# Patient Record
Sex: FEMALE | URBAN - METROPOLITAN AREA
[De-identification: names, ages, dates, MRNs, and addresses within clinical notes are randomized per-mention and may not be internally consistent; named-entity substitution may affect disease eponyms.]

---

## 2023-03-15 ENCOUNTER — APPOINTMENT (RX ONLY)
Dept: URBAN - METROPOLITAN AREA CLINIC 151 | Facility: CLINIC | Age: 38
Setting detail: DERMATOLOGY
End: 2023-03-15

## 2023-03-15 DIAGNOSIS — D18.0 HEMANGIOMA: ICD-10-CM

## 2023-03-15 DIAGNOSIS — Z85.820 PERSONAL HISTORY OF MALIGNANT MELANOMA OF SKIN: ICD-10-CM

## 2023-03-15 DIAGNOSIS — L81.4 OTHER MELANIN HYPERPIGMENTATION: ICD-10-CM

## 2023-03-15 DIAGNOSIS — Z85.828 PERSONAL HISTORY OF OTHER MALIGNANT NEOPLASM OF SKIN: ICD-10-CM

## 2023-03-15 DIAGNOSIS — L23.9 ALLERGIC CONTACT DERMATITIS, UNSPECIFIED CAUSE: ICD-10-CM | Status: WORSENING

## 2023-03-15 DIAGNOSIS — D22 MELANOCYTIC NEVI: ICD-10-CM

## 2023-03-15 PROBLEM — D23.71 OTHER BENIGN NEOPLASM OF SKIN OF RIGHT LOWER LIMB, INCLUDING HIP: Status: ACTIVE | Noted: 2023-03-15

## 2023-03-15 PROBLEM — D22.5 MELANOCYTIC NEVI OF TRUNK: Status: ACTIVE | Noted: 2023-03-15

## 2023-03-15 PROBLEM — D18.01 HEMANGIOMA OF SKIN AND SUBCUTANEOUS TISSUE: Status: ACTIVE | Noted: 2023-03-15

## 2023-03-15 PROCEDURE — ? PRESCRIPTION

## 2023-03-15 PROCEDURE — ? COUNSELING

## 2023-03-15 PROCEDURE — ? PRESCRIPTION MEDICATION MANAGEMENT

## 2023-03-15 PROCEDURE — 99204 OFFICE O/P NEW MOD 45 MIN: CPT

## 2023-03-15 PROCEDURE — ? DIAGNOSIS COMMENT

## 2023-03-15 RX ORDER — HYDROCORTISONE 25 MG/G
OINTMENT TOPICAL
Qty: 20 | Refills: 0 | Status: ERX | COMMUNITY
Start: 2023-03-15

## 2023-03-15 RX ADMIN — HYDROCORTISONE: 25 OINTMENT TOPICAL at 00:00

## 2023-03-15 ASSESSMENT — LOCATION DETAILED DESCRIPTION DERM
LOCATION DETAILED: RIGHT INFERIOR UPPER BACK
LOCATION DETAILED: SUPERIOR THORACIC SPINE

## 2023-03-15 ASSESSMENT — LOCATION ZONE DERM: LOCATION ZONE: TRUNK

## 2023-03-15 ASSESSMENT — LOCATION SIMPLE DESCRIPTION DERM
LOCATION SIMPLE: UPPER BACK
LOCATION SIMPLE: RIGHT UPPER BACK

## 2023-03-15 NOTE — PROCEDURE: DIAGNOSIS COMMENT
Render Risk Assessment In Note?: no
Detail Level: Detailed
Comment: R popliteal fossa s/p WLE 11/2019. Pt reports no SNL bx. Will obtain outside records from dermatologist in [place] for further information re: depth. Recommend regular appt with dentist, PCP, OBGYN, and ophthalmology given history. No LAD on exam today
Comment: On right submental chin s/p WLE 5/2021. Pt unsure if BCC or SCC, but reports it was a NMSC. Will get outside records from pt’s dermatologist in [place] and recommend follow-up FBSE q6 months or sooner if concerns.
Comment: Recommend 6 month FBSE or sooner if concerns.
Detail Level: Simple
Comment: Flaring despite topical steroid drops from ENT and OTC HCT. Will start topical HCT ointment as below to outside of ear only, counseling as below. Discussed possible triggers including cellphone given unilateral distribution, geometric appearance, and prior positive patch testing to nickel, cobalt. Pt does not currently wear headphones or jewelry.

## 2023-03-15 NOTE — PROCEDURE: PRESCRIPTION MEDICATION MANAGEMENT
Detail Level: Zone
Initiate Treatment: hydrocortisone 2.5 % topical ointment
Render In Strict Bullet Format?: No

## 2023-10-23 PROBLEM — L81.4 OTHER MELANIN HYPERPIGMENTATION: Status: ACTIVE | Noted: 2021-07-06

## 2023-10-23 PROBLEM — L23.0 CONTACT DERMATITIS DUE TO METAL: Status: ACTIVE | Noted: 2023-10-23

## 2023-10-23 PROBLEM — H92.03 OTALGIA, BILATERAL: Status: ACTIVE | Noted: 2023-10-23

## 2023-10-23 PROBLEM — F41.0 PANIC ATTACKS: Status: ACTIVE | Noted: 2023-10-23

## 2023-10-23 PROBLEM — M22.41 CHONDROMALACIA OF RIGHT PATELLA: Status: ACTIVE | Noted: 2023-10-23

## 2023-10-23 PROBLEM — D22.60 MELANOCYTIC NEVI OF UNSPECIFIED UPPER LIMB, INCLUDING SHOULDER: Status: ACTIVE | Noted: 2021-07-06

## 2023-10-23 PROBLEM — J32.2 CHRONIC ETHMOIDAL SINUSITIS: Status: ACTIVE | Noted: 2023-10-23

## 2023-10-23 PROBLEM — Z98.890 STATUS POST BILATERAL BREAST REDUCTION: Status: ACTIVE | Noted: 2023-10-23

## 2023-10-23 PROBLEM — D22.5 MELANOCYTIC NEVI OF TRUNK: Status: ACTIVE | Noted: 2021-07-06

## 2023-10-23 PROBLEM — L91.0 KELOID: Status: ACTIVE | Noted: 2021-07-06

## 2023-10-23 PROBLEM — F41.1 GAD (GENERALIZED ANXIETY DISORDER): Status: ACTIVE | Noted: 2023-10-23

## 2023-10-23 PROBLEM — L70.0 ACNE VULGARIS: Status: ACTIVE | Noted: 2021-07-06

## 2023-10-23 PROBLEM — D48.5 NEOPLASM OF UNCERTAIN BEHAVIOR OF SKIN: Status: ACTIVE | Noted: 2021-07-06

## 2023-10-23 PROBLEM — D22.71 MELANOCYTIC NEVI OF RIGHT LOWER LIMB, INCLUDING HIP: Status: ACTIVE | Noted: 2021-07-06

## 2023-10-23 PROBLEM — M26.69 TMJ CREPITUS: Status: ACTIVE | Noted: 2023-10-23

## 2023-10-23 PROBLEM — J34.2 DEVIATED NASAL SEPTUM: Status: ACTIVE | Noted: 2023-10-23

## 2023-10-23 PROBLEM — F50.81 BINGE-EATING DISORDER, MILD: Status: ACTIVE | Noted: 2023-10-23

## 2023-10-23 PROBLEM — H60.90 RECURRENT OTITIS EXTERNA: Status: ACTIVE | Noted: 2023-10-23

## 2023-10-23 PROBLEM — D18.01 HEMANGIOMA OF SKIN AND SUBCUTANEOUS TISSUE: Status: ACTIVE | Noted: 2021-07-06

## 2023-10-23 PROBLEM — H60.8X3 CHRONIC ECZEMATOUS OTITIS EXTERNA OF BOTH EARS: Status: ACTIVE | Noted: 2023-10-23

## 2023-10-23 PROBLEM — M22.42 CHONDROMALACIA OF LEFT PATELLA: Status: ACTIVE | Noted: 2023-10-23

## 2023-10-23 PROBLEM — J30.9 ALLERGIC RHINITIS: Status: ACTIVE | Noted: 2023-10-23

## 2023-10-23 PROBLEM — Z85.820 PERSONAL HISTORY OF MALIGNANT MELANOMA OF SKIN: Status: ACTIVE | Noted: 2021-07-06

## 2023-10-23 PROBLEM — L90.5 SCAR CONDITION AND FIBROSIS OF SKIN: Status: ACTIVE | Noted: 2021-07-06

## 2023-10-23 PROBLEM — F33.9 DEPRESSION, RECURRENT (CMS-HCC): Status: ACTIVE | Noted: 2023-10-23

## 2023-10-23 PROBLEM — N62 MACROMASTIA: Status: ACTIVE | Noted: 2023-10-23

## 2023-10-23 PROBLEM — L91.8 OTHER HYPERTROPHIC DISORDERS OF THE SKIN: Status: ACTIVE | Noted: 2021-07-06

## 2023-10-23 PROBLEM — L85.3 XEROSIS CUTIS: Status: ACTIVE | Noted: 2021-07-06

## 2023-10-23 PROBLEM — F50.810 BINGE-EATING DISORDER, MILD: Status: ACTIVE | Noted: 2023-10-23

## 2023-10-23 RX ORDER — MINERAL OIL
ENEMA (ML) RECTAL
COMMUNITY
Start: 2021-07-02 | End: 2023-10-25 | Stop reason: ALTCHOICE

## 2023-10-23 RX ORDER — HYDROXYZINE HYDROCHLORIDE 25 MG/1
25 TABLET, FILM COATED ORAL NIGHTLY
COMMUNITY
Start: 2019-10-15

## 2023-10-23 RX ORDER — LEVONORGESTREL 52 MG/1
INTRAUTERINE DEVICE INTRAUTERINE
COMMUNITY
Start: 2019-07-12

## 2023-10-23 RX ORDER — CLOBETASOL PROPIONATE 0.46 MG/ML
SOLUTION TOPICAL 2 TIMES DAILY
COMMUNITY
Start: 2021-12-30 | End: 2023-10-25 | Stop reason: ALTCHOICE

## 2023-10-23 RX ORDER — CLINDAMYCIN PHOSPHATE 10 UG/ML
1 LOTION TOPICAL
COMMUNITY
Start: 2019-01-16 | End: 2023-10-25 | Stop reason: ALTCHOICE

## 2023-10-23 RX ORDER — SERTRALINE HYDROCHLORIDE 100 MG/1
TABLET, FILM COATED ORAL
COMMUNITY
Start: 2021-06-30 | End: 2023-11-03 | Stop reason: ALTCHOICE

## 2023-10-23 RX ORDER — FLUTICASONE PROPIONATE 50 MCG
1 SPRAY, SUSPENSION (ML) NASAL 2 TIMES DAILY
COMMUNITY
Start: 2020-08-05 | End: 2023-10-25 | Stop reason: ALTCHOICE

## 2023-10-23 RX ORDER — SPIRONOLACTONE AND HYDROCHLOROTHIAZIDE 25; 25 MG/1; MG/1
TABLET ORAL
COMMUNITY
Start: 2019-03-05 | End: 2023-10-25 | Stop reason: ALTCHOICE

## 2023-10-23 RX ORDER — DEXTROAMPHETAMINE SACCHARATE, AMPHETAMINE ASPARTATE MONOHYDRATE, DEXTROAMPHETAMINE SULFATE AND AMPHETAMINE SULFATE 7.5; 7.5; 7.5; 7.5 MG/1; MG/1; MG/1; MG/1
CAPSULE, EXTENDED RELEASE ORAL
COMMUNITY
Start: 2018-02-15 | End: 2023-11-03 | Stop reason: ALTCHOICE

## 2023-10-23 RX ORDER — AZELASTINE 1 MG/ML
2 SPRAY, METERED NASAL 2 TIMES DAILY
COMMUNITY
Start: 2020-08-05 | End: 2023-10-25 | Stop reason: ALTCHOICE

## 2023-10-25 ENCOUNTER — OFFICE VISIT (OUTPATIENT)
Dept: DERMATOLOGY | Facility: CLINIC | Age: 38
End: 2023-10-25
Payer: COMMERCIAL

## 2023-10-25 DIAGNOSIS — D18.01 HEMANGIOMA OF SKIN: ICD-10-CM

## 2023-10-25 DIAGNOSIS — D48.5 NEOPLASM OF UNCERTAIN BEHAVIOR OF SKIN: Primary | ICD-10-CM

## 2023-10-25 DIAGNOSIS — L81.4 LENTIGO: ICD-10-CM

## 2023-10-25 DIAGNOSIS — Z85.820 PERSONAL HISTORY OF MALIGNANT MELANOMA OF SKIN: ICD-10-CM

## 2023-10-25 DIAGNOSIS — L90.5 SCAR CONDITIONS AND FIBROSIS OF SKIN: ICD-10-CM

## 2023-10-25 DIAGNOSIS — D22.9 MELANOCYTIC NEVUS, UNSPECIFIED LOCATION: ICD-10-CM

## 2023-10-25 PROCEDURE — 88305 TISSUE EXAM BY PATHOLOGIST: CPT | Performed by: DERMATOLOGY

## 2023-10-25 PROCEDURE — 88342 IMHCHEM/IMCYTCHM 1ST ANTB: CPT | Performed by: DERMATOLOGY

## 2023-10-25 PROCEDURE — 99213 OFFICE O/P EST LOW 20 MIN: CPT | Performed by: DERMATOLOGY

## 2023-10-25 PROCEDURE — 88305 TISSUE EXAM BY PATHOLOGIST: CPT | Mod: TC,DER | Performed by: DERMATOLOGY

## 2023-10-25 PROCEDURE — 88341 IMHCHEM/IMCYTCHM EA ADD ANTB: CPT | Performed by: DERMATOLOGY

## 2023-10-25 PROCEDURE — 11102 TANGNTL BX SKIN SINGLE LES: CPT | Performed by: SPECIALIST

## 2023-10-25 PROCEDURE — 1036F TOBACCO NON-USER: CPT | Performed by: DERMATOLOGY

## 2023-10-25 RX ORDER — LISDEXAMFETAMINE DIMESYLATE 40 MG/1
40 CAPSULE ORAL EVERY MORNING
COMMUNITY

## 2023-10-25 ASSESSMENT — DERMATOLOGY PATIENT ASSESSMENT
DO YOU USE SUNSCREEN: DAILY
WHERE ARE THESE NEW OR CHANGING LESIONS LOCATED: LEFT LEG
DO YOU HAVE ANY NEW OR CHANGING LESIONS: YES
DO YOU HAVE IRREGULAR MENSTRUAL CYCLES: NO
ARE YOU ON BIRTH CONTROL: YES
HAVE YOU HAD OR DO YOU HAVE A STAPH INFECTION: NO
HAVE YOU HAD OR DO YOU HAVE VASCULAR DISEASE: NO
ARE YOU AN ORGAN TRANSPLANT RECIPIENT: NO
DO YOU USE A TANNING BED: YES, PREVIOUSLY
WHAT TYPE OF BIRTH CONTROL: MIRENA
ARE YOU TRYING TO GET PREGNANT: NO

## 2023-10-25 ASSESSMENT — DERMATOLOGY QUALITY OF LIFE (QOL) ASSESSMENT
RATE HOW BOTHERED YOU ARE BY SYMPTOMS OF YOUR SKIN PROBLEM (EG, ITCHING, STINGING BURNING, HURTING OR SKIN IRRITATION): 0 - NEVER BOTHERED
DATE THE QUALITY-OF-LIFE ASSESSMENT WAS COMPLETED: 66772
ARE THERE EXCLUSIONS OR EXCEPTIONS FOR THE QUALITY OF LIFE ASSESSMENT: NO
RATE HOW EMOTIONALLY BOTHERED YOU ARE BY YOUR SKIN PROBLEM (FOR EXAMPLE, WORRY, EMBARRASSMENT, FRUSTRATION): 0 - NEVER BOTHERED
WHAT SINGLE SKIN CONDITION LISTED BELOW IS THE PATIENT ANSWERING THE QUALITY-OF-LIFE ASSESSMENT QUESTIONS ABOUT: NONE OF THE ABOVE

## 2023-10-25 ASSESSMENT — ITCH NUMERIC RATING SCALE: HOW SEVERE IS YOUR ITCHING?: 0

## 2023-10-25 NOTE — PROGRESS NOTES
Subjective   Adriana Gutierrez is a 38 y.o. female who presents for the following: Skin Check and Suspicious Skin Lesion.    Skin Cancer Screening  She has a history of moderate sun exposure. She is in the sun occasionally. She uses sunscreen occasionally. She reports no skin symptoms. Her moles are not changing.      History of MM R popliteal fossa in 2019 and severely dysplastic nevus R superior mid neck 2020    Skin Lesion  She describes it as a spot, that is located on her left leg.  It was first noticed several months ago. It has been causing no skin symptoms and is not changing.       Objective   Well appearing patient in no apparent distress; mood and affect are within normal limits.    A full examination was performed including scalp, head, eyes, ears, nose, lips, neck, chest, axillae, abdomen, back, buttocks, bilateral upper extremities, bilateral lower extremities, hands, feet, fingers, toes, fingernails, and toenails. All findings within normal limits unless otherwise noted below.    Scattered cherry-red papule(s).    All nevi were symmetric brown macules without atypia on dermoscopy.     Scattered tan macules in sun-exposed areas.    Scar is well-healed without evidence of recurrence (under chin, R popliteal fossa)  No palpable JUAN F on exam    No signs of recurrence    Left Thigh - Anterior  3x4mm dark brown papule            Assessment/Plan   Neoplasm of uncertain behavior of skin  Left Thigh - Anterior    Lesion biopsy  Type of biopsy: tangential    Informed consent: discussed and consent obtained    Timeout: patient name, date of birth, surgical site, and procedure verified    Procedure prep:  Patient was prepped and draped  Anesthesia: the lesion was anesthetized in a standard fashion    Anesthetic:  1% lidocaine w/ epinephrine 1-100,000 local infiltration  Instrument used: DermaBlade    Hemostasis achieved with: aluminum chloride    Outcome: patient tolerated procedure well    Post-procedure details:  sterile dressing applied and wound care instructions given    Dressing type: petrolatum and bandage      Staff Communication: Dermatology Local Anesthesia: 1 % Lidocaine / Epinephrine - Amount: 0.4cc    Specimen 1 - Dermatopathology- DERM LAB  Differential Diagnosis: DN vs MIS  Check Margins Yes/No?:   yes  Comments:    Dermpath Lab: Routine Histopathology (formalin-fixed tissue)    Neoplasm(s) of uncertain behavior  - R/o DN vs MIS  - Discussed biopsy of lesion due to concerning features. The risks and benefits were discussed with the patient. The patient expressed understanding and agreed to biopsy. The patient tolerated the procedure well and wound care instructions were provided. Will call in 1-2 weeks to discuss results.   - Also discussed photo-protection including wearing sunscreen and a wide brimmed hat. Discussed concerning features of a lesion including not healing or bleeding or evolving and to return if these were noted.     Hemangioma of skin    Cherry Angiomas  - Discussed with patient these are benign vascular lesions and no treatment is necessary.       Melanocytic nevus, unspecified location    The ABCDEs of melanoma and warning signs of non-melanoma skin cancer were discussed with patient and patient expressed understanding. Sun protection and use of at least SPF 30 discussed with patient. Pt instructed to reapply every 2 hours.     Lentigo    The ABCDEs of melanoma and warning signs of non-melanoma skin cancer were discussed with patient and patient expressed understanding. Sun protection and use of at least SPF 30 discussed with patient. Pt instructed to reapply every 2 hours.     Scar conditions and fibrosis of skin    - no signs of recurrence or clinical adenopathy on exam  - continue regular skin checks    Personal history of malignant melanoma of skin    - continue regular skin checks  - also reviewed regular eye and dental exams    Related Procedures  Follow Up In Dermatology - Established  Patient    Follow-up in 1  year, sooner if needed pending biopsy results    10/25/2023 11:58 AM  Irish Hernández MD  Dermatology Resident, PGY-4     I saw and evaluated the patient. I personally obtained the key and critical portions of the history and physical exam or was physically present for key and critical portions performed by the student/resident. I reviewed the student/resident's documentation and discussed the patient with the student/resident. I agree with the student/resident's medical decision making as documented in the note.

## 2023-11-02 LAB
LAB AP ASR DISCLAIMER: NORMAL
LABORATORY COMMENT REPORT: NORMAL
PATH REPORT.FINAL DX SPEC: NORMAL
PATH REPORT.GROSS SPEC: NORMAL
PATH REPORT.RELEVANT HX SPEC: NORMAL
PATH REPORT.TOTAL CANCER: NORMAL

## 2023-11-03 ENCOUNTER — OFFICE VISIT (OUTPATIENT)
Dept: PRIMARY CARE | Facility: CLINIC | Age: 38
End: 2023-11-03
Payer: COMMERCIAL

## 2023-11-03 ENCOUNTER — LAB (OUTPATIENT)
Dept: LAB | Facility: LAB | Age: 38
End: 2023-11-03
Payer: COMMERCIAL

## 2023-11-03 VITALS
WEIGHT: 189 LBS | TEMPERATURE: 98.2 F | BODY MASS INDEX: 35.71 KG/M2 | DIASTOLIC BLOOD PRESSURE: 74 MMHG | SYSTOLIC BLOOD PRESSURE: 110 MMHG | OXYGEN SATURATION: 99 % | RESPIRATION RATE: 16 BRPM | HEART RATE: 87 BPM

## 2023-11-03 DIAGNOSIS — K62.5 BRBPR (BRIGHT RED BLOOD PER RECTUM): ICD-10-CM

## 2023-11-03 DIAGNOSIS — K59.00 CONSTIPATION, UNSPECIFIED CONSTIPATION TYPE: ICD-10-CM

## 2023-11-03 DIAGNOSIS — R04.2 HEMOPTYSIS: Primary | ICD-10-CM

## 2023-11-03 DIAGNOSIS — K21.9 GASTROESOPHAGEAL REFLUX DISEASE WITHOUT ESOPHAGITIS: ICD-10-CM

## 2023-11-03 DIAGNOSIS — R04.2 HEMOPTYSIS: ICD-10-CM

## 2023-11-03 LAB
ALBUMIN SERPL BCP-MCNC: 4.1 G/DL (ref 3.4–5)
ALP SERPL-CCNC: 50 U/L (ref 33–110)
ALT SERPL W P-5'-P-CCNC: 11 U/L (ref 7–45)
ANION GAP SERPL CALC-SCNC: 9 MMOL/L (ref 10–20)
AST SERPL W P-5'-P-CCNC: 12 U/L (ref 9–39)
BASOPHILS # BLD AUTO: 0.03 X10*3/UL (ref 0–0.1)
BASOPHILS NFR BLD AUTO: 0.4 %
BILIRUB SERPL-MCNC: 0.3 MG/DL (ref 0–1.2)
BUN SERPL-MCNC: 12 MG/DL (ref 6–23)
CALCIUM SERPL-MCNC: 9.2 MG/DL (ref 8.6–10.3)
CHLORIDE SERPL-SCNC: 104 MMOL/L (ref 98–107)
CO2 SERPL-SCNC: 30 MMOL/L (ref 21–32)
CREAT SERPL-MCNC: 0.74 MG/DL (ref 0.5–1.05)
EOSINOPHIL # BLD AUTO: 0.08 X10*3/UL (ref 0–0.7)
EOSINOPHIL NFR BLD AUTO: 1 %
ERYTHROCYTE [DISTWIDTH] IN BLOOD BY AUTOMATED COUNT: 12.2 % (ref 11.5–14.5)
GFR SERPL CREATININE-BSD FRML MDRD: >90 ML/MIN/1.73M*2
GLUCOSE SERPL-MCNC: 94 MG/DL (ref 74–99)
HCT VFR BLD AUTO: 39.1 % (ref 36–46)
HGB BLD-MCNC: 12.6 G/DL (ref 12–16)
IMM GRANULOCYTES # BLD AUTO: 0.02 X10*3/UL (ref 0–0.7)
IMM GRANULOCYTES NFR BLD AUTO: 0.3 % (ref 0–0.9)
IRON SATN MFR SERPL: 42 %
IRON SERPL-MCNC: 137 UG/DL (ref 35–150)
LYMPHOCYTES # BLD AUTO: 2.26 X10*3/UL (ref 1.2–4.8)
LYMPHOCYTES NFR BLD AUTO: 28.3 %
MCH RBC QN AUTO: 29.8 PG (ref 26–34)
MCHC RBC AUTO-ENTMCNC: 32.2 G/DL (ref 32–36)
MCV RBC AUTO: 92 FL (ref 80–100)
MONOCYTES # BLD AUTO: 0.44 X10*3/UL (ref 0.1–1)
MONOCYTES NFR BLD AUTO: 5.5 %
NEUTROPHILS # BLD AUTO: 5.15 X10*3/UL (ref 1.2–7.7)
NEUTROPHILS NFR BLD AUTO: 64.5 %
NRBC BLD-RTO: 0 /100 WBCS (ref 0–0)
PLATELET # BLD AUTO: 235 X10*3/UL (ref 150–450)
POTASSIUM SERPL-SCNC: 4.4 MMOL/L (ref 3.5–5.3)
PROT SERPL-MCNC: 6.4 G/DL (ref 6.4–8.2)
RBC # BLD AUTO: 4.23 X10*6/UL (ref 4–5.2)
SODIUM SERPL-SCNC: 139 MMOL/L (ref 136–145)
TIBC SERPL-MCNC: 324 UG/DL
UIBC SERPL-MCNC: 187 UG/DL (ref 110–370)
WBC # BLD AUTO: 8 X10*3/UL (ref 4.4–11.3)

## 2023-11-03 PROCEDURE — 99214 OFFICE O/P EST MOD 30 MIN: CPT | Performed by: FAMILY MEDICINE

## 2023-11-03 PROCEDURE — 80053 COMPREHEN METABOLIC PANEL: CPT

## 2023-11-03 PROCEDURE — 36415 COLL VENOUS BLD VENIPUNCTURE: CPT

## 2023-11-03 PROCEDURE — 83540 ASSAY OF IRON: CPT

## 2023-11-03 PROCEDURE — 1036F TOBACCO NON-USER: CPT | Performed by: FAMILY MEDICINE

## 2023-11-03 PROCEDURE — 85025 COMPLETE CBC W/AUTO DIFF WBC: CPT

## 2023-11-03 PROCEDURE — 83550 IRON BINDING TEST: CPT

## 2023-11-03 RX ORDER — OMEPRAZOLE 40 MG/1
CAPSULE, DELAYED RELEASE ORAL
COMMUNITY
Start: 2023-10-31 | End: 2023-11-03 | Stop reason: SDUPTHER

## 2023-11-03 RX ORDER — OMEPRAZOLE 40 MG/1
40 CAPSULE, DELAYED RELEASE ORAL
Qty: 30 CAPSULE | Refills: 1 | Status: SHIPPED
Start: 2023-11-03 | End: 2024-01-05 | Stop reason: ALTCHOICE

## 2023-11-03 RX ORDER — SERTRALINE HYDROCHLORIDE 200 MG/1
200 CAPSULE ORAL DAILY
COMMUNITY

## 2023-11-03 NOTE — PATIENT INSTRUCTIONS
Today we addressed your vomiting of blood and urgicare visit.   I would like to have you check labs today and continue your omeprazole for 2 more months  GI referral has been placed as well I think that you will need an EGD and possibly a colonscopy for seeing blood in your stool as well.     Follow up when results received.

## 2023-11-03 NOTE — PROGRESS NOTES
Subjective   Patient ID: Adriana Gutierrez is a 38 y.o. female who presents for Follow-up (Pt was seen at urgent care health [facility] in [place] on [address] 10/31/2023 - heartburn and vomiting. Blood seen in vomit ).    She got bad acid reflux on Monday and then was throwing up and saw blood in the vomiting.  She went to an urgicare and they examined her.  They prescribed prilosec. She got it Wednesday and started it yesterday.  She hasn't had anymore vomiting of blood.  There was bright red blood in the vomiting.  Seh is having some pain in the center of the belly and it was burning up into the chest pretty bad as well.  Today it's a little better.  She denies black tarry stool or bright red blood in the stool.  There was blood both on the toilet tissue and in the toilet itself. She was constipated at that time.  She doesn't know if she has ever had a cscope or an egd.           Review of Systems    Objective   /74   Pulse 87   Temp 36.8 °C (98.2 °F)   Resp 16   Wt 85.7 kg (189 lb)   SpO2 99%   BMI 35.71 kg/m²     Physical Exam  Constitutional:       Appearance: Normal appearance.   HENT:      Head: Normocephalic and atraumatic.      Mouth/Throat:      Mouth: Mucous membranes are moist.   Eyes:      Pupils: Pupils are equal, round, and reactive to light.   Cardiovascular:      Rate and Rhythm: Normal rate and regular rhythm.      Pulses: Normal pulses.   Pulmonary:      Effort: Pulmonary effort is normal.      Breath sounds: Normal breath sounds.   Abdominal:      General: Bowel sounds are normal. There is no distension.      Palpations: There is no mass.      Tenderness: There is no abdominal tenderness. There is no guarding.   Musculoskeletal:      Cervical back: Normal range of motion and neck supple.   Neurological:      Mental Status: She is alert.         Assessment/Plan   Diagnoses and all orders for this visit:  Hemoptysis  -     CBC and Auto Differential; Future  -     Comprehensive Metabolic  Panel; Future  -     Iron and TIBC; Future  BRBPR (bright red blood per rectum)  Constipation, unspecified constipation type  Gastroesophageal reflux disease without esophagitis  -     omeprazole (PriLOSEC) 40 mg DR capsule; Take 1 capsule (40 mg) by mouth once daily in the morning. Take before meals. Do not crush or chew.  -     Referral to Gastroenterology; Future

## 2023-11-07 DIAGNOSIS — D23.72 DYSPLASTIC NEVUS OF LEFT LOWER EXTREMITY: Primary | ICD-10-CM

## 2023-11-07 NOTE — RESULT ENCOUNTER NOTE
Patient has been informed and verbalized understanding. She has been scheduled for 12/4/2023 @ 10:15 and order for PA has been submitted.

## 2023-11-29 ENCOUNTER — OFFICE VISIT (OUTPATIENT)
Dept: GASTROENTEROLOGY | Facility: CLINIC | Age: 38
End: 2023-11-29
Payer: COMMERCIAL

## 2023-11-29 VITALS
HEIGHT: 61 IN | SYSTOLIC BLOOD PRESSURE: 157 MMHG | BODY MASS INDEX: 36.06 KG/M2 | DIASTOLIC BLOOD PRESSURE: 84 MMHG | WEIGHT: 191 LBS | HEART RATE: 85 BPM

## 2023-11-29 DIAGNOSIS — K92.0 HEMATEMESIS WITH NAUSEA: ICD-10-CM

## 2023-11-29 DIAGNOSIS — F41.1 GAD (GENERALIZED ANXIETY DISORDER): ICD-10-CM

## 2023-11-29 DIAGNOSIS — F33.9 DEPRESSION, RECURRENT (CMS-HCC): ICD-10-CM

## 2023-11-29 DIAGNOSIS — K21.9 GASTROESOPHAGEAL REFLUX DISEASE WITHOUT ESOPHAGITIS: ICD-10-CM

## 2023-11-29 DIAGNOSIS — K92.1 HEMATOCHEZIA: Primary | ICD-10-CM

## 2023-11-29 PROCEDURE — 1036F TOBACCO NON-USER: CPT | Performed by: STUDENT IN AN ORGANIZED HEALTH CARE EDUCATION/TRAINING PROGRAM

## 2023-11-29 PROCEDURE — 99205 OFFICE O/P NEW HI 60 MIN: CPT | Performed by: STUDENT IN AN ORGANIZED HEALTH CARE EDUCATION/TRAINING PROGRAM

## 2023-11-29 RX ORDER — SODIUM CHLORIDE, SODIUM LACTATE, POTASSIUM CHLORIDE, CALCIUM CHLORIDE 600; 310; 30; 20 MG/100ML; MG/100ML; MG/100ML; MG/100ML
20 INJECTION, SOLUTION INTRAVENOUS CONTINUOUS
Status: CANCELLED | OUTPATIENT
Start: 2023-11-29

## 2023-11-29 RX ORDER — ONDANSETRON HYDROCHLORIDE 2 MG/ML
4 INJECTION, SOLUTION INTRAVENOUS ONCE AS NEEDED
Status: CANCELLED | OUTPATIENT
Start: 2023-11-29

## 2023-11-29 NOTE — PROGRESS NOTES
Subjective     History of Present Illness:   Adriana Gutierrez is a 38 y.o. female with hx of AGUSTINA, depression, GERD and binge eating disorder who presents to clinic for episode of resolved hematemesis and  blood streaked stool.    She states one month ago had worsening nausea associated with several episodes of blood-streaked emesis that resolved on its own.  The following day she was started on omeprazole and has been on this since.  She has not had recurrence of the symptoms since.  She has not had prior hematemesis in the past.  She does endorse a history of heartburn though this is sporadic and occurs every other month on average.  She has not otherwise had any weight loss.  She states her bowel movements are regular and she has 1 soft bowel movement a day.  She does endorse episodes of blood-streaked  over the past month as well, this does not fill the toilet bowel and is usually surrounding the stool or on the toilet paper.  She has no abdominal pain.  She has no family history of GI malignancy.      Past Medical History   has a past medical history of Acne, ADHD (attention deficit hyperactivity disorder), Allergic, Anxiety, Depression, Encounter for screening for infections with a predominantly sexual mode of transmission (02/15/2018), GERD (gastroesophageal reflux disease), Headache, Heart murmur, Melanoma (CMS/[facility]) (10/2019), Other specified health status (02/15/2018), Personal history of other diseases of the female genital tract (02/15/2018), Personal history of other infectious and parasitic diseases (12/15/2014), and Urticaria.     Social History   reports that she has quit smoking. Her smoking use included cigarettes. She has never used smokeless tobacco. She reports that she does not currently use alcohol. She reports that she does not use drugs.     Family History  family history includes Allergies in an other family member; Arthritis in her maternal grandmother and mother; Cancer in her father's  sister; Diabetes in her mother; Eczema in her sister; Hypertension in her mother; Rashes / Skin problems in her sister; Stroke in her paternal grandmother.     Allergies  Allergies   Allergen Reactions    Doxycycline GI Upset       Medications  Current Outpatient Medications   Medication Instructions    hydrOXYzine HCL (ATARAX) 25 mg, oral, 4 times daily PRN    levonorgestrel (Mirena) 21 mcg/24 hours (8 yrs) 52 mg IUD intrauterine    lisdexamfetamine (VYVANSE) 40 mg, oral, Every morning    omeprazole (PRILOSEC) 40 mg, oral, Daily before breakfast, Do not crush or chew.    sertraline 200 mg, oral, Daily        Objective   Visit Vitals  /84   Pulse 85      Physical Exam  Constitutional:       General: She is not in acute distress.     Appearance: Normal appearance.   HENT:      Head: Normocephalic and atraumatic.      Mouth/Throat:      Mouth: Mucous membranes are moist.   Eyes:      Extraocular Movements: Extraocular movements intact.   Pulmonary:      Effort: Pulmonary effort is normal.      Breath sounds: No stridor. No wheezing.   Abdominal:      General: Abdomen is flat. There is no distension.      Palpations: Abdomen is soft. There is no mass.      Tenderness: There is no abdominal tenderness. There is no guarding or rebound.   Musculoskeletal:         General: Normal range of motion.   Skin:     General: Skin is warm and dry.   Neurological:      General: No focal deficit present.      Mental Status: She is alert.   Psychiatric:         Mood and Affect: Mood normal.         Judgment: Judgment normal.       Assessment/Plan   Adriana Gutierrez is a 38 y.o. female with hx of AGUSTINA, depression, GERD and binge eating disorder who presents to clinic for episode of resolved hematemesis and  blood streaked stool.     Symptoms are suggestive of rectal outlet bleeding.  She reassuringly has normal hemoglobin levels.  No alarm signs otherwise.  Unclear etiology of resolved episode of emesis however we will plan on upper  endoscopy and colonoscopy to rule out any anatomical pathology.  She may continue omeprazole to complete an 8-week course for now.    Of note patient, endorses significant postnasal drip and follows with ENT for this.  She states the omeprazole has not improved these symptoms.  Discussed with patient that her sporadic heartburn (occurring every other month) is unlikely to be cause of her postnasal drip, however if she has worsening heartburn after completion  of omeprazole course we may consider further esophageal testing at that time.    Problem List Items Addressed This Visit          Mental Health    Depression, recurrent (CMS/HCC)    AGUSTINA (generalized anxiety disorder)     Other Visit Diagnoses       Hematochezia    -  Primary    Relevant Orders    Colonoscopy Diagnostic (hematochezia)    Gastroesophageal reflux disease without esophagitis        Relevant Orders    EGD    Hematemesis with nausea                       Lubna Brooks MD         My final recommendations will be communicated back to the requesting physician by way of shared Medical record or letter to requesting physician via fax.

## 2023-12-04 ENCOUNTER — PROCEDURE VISIT (OUTPATIENT)
Dept: DERMATOLOGY | Facility: CLINIC | Age: 38
End: 2023-12-04
Payer: COMMERCIAL

## 2023-12-04 DIAGNOSIS — D48.5 NEOPLASM OF UNCERTAIN BEHAVIOR OF SKIN: ICD-10-CM

## 2023-12-04 PROCEDURE — 12032 INTMD RPR S/A/T/EXT 2.6-7.5: CPT | Performed by: SPECIALIST

## 2023-12-04 PROCEDURE — 88305 TISSUE EXAM BY PATHOLOGIST: CPT | Mod: TC,DER | Performed by: DERMATOLOGY

## 2023-12-04 PROCEDURE — 11402 EXC TR-EXT B9+MARG 1.1-2 CM: CPT | Performed by: SPECIALIST

## 2023-12-04 PROCEDURE — 88305 TISSUE EXAM BY PATHOLOGIST: CPT | Performed by: DERMATOLOGY

## 2023-12-04 ASSESSMENT — DERMATOLOGY QUALITY OF LIFE (QOL) ASSESSMENT
ARE THERE EXCLUSIONS OR EXCEPTIONS FOR THE QUALITY OF LIFE ASSESSMENT: NO
DATE THE QUALITY-OF-LIFE ASSESSMENT WAS COMPLETED: 66812
WHAT SINGLE SKIN CONDITION LISTED BELOW IS THE PATIENT ANSWERING THE QUALITY-OF-LIFE ASSESSMENT QUESTIONS ABOUT: NONE OF THE ABOVE
RATE HOW BOTHERED YOU ARE BY SYMPTOMS OF YOUR SKIN PROBLEM (EG, ITCHING, STINGING BURNING, HURTING OR SKIN IRRITATION): 0 - NEVER BOTHERED
RATE HOW EMOTIONALLY BOTHERED YOU ARE BY YOUR SKIN PROBLEM (FOR EXAMPLE, WORRY, EMBARRASSMENT, FRUSTRATION): 0 - NEVER BOTHERED
RATE HOW BOTHERED YOU ARE BY EFFECTS OF YOUR SKIN PROBLEMS ON YOUR ACTIVITIES (EG, GOING OUT, ACCOMPLISHING WHAT YOU WANT, WORK ACTIVITIES OR YOUR RELATIONSHIPS WITH OTHERS): 0 - NEVER BOTHERED

## 2023-12-04 ASSESSMENT — DERMATOLOGY PATIENT ASSESSMENT
HAVE YOU HAD OR DO YOU HAVE VASCULAR DISEASE: NO
ARE YOU AN ORGAN TRANSPLANT RECIPIENT: NO
ARE YOU TRYING TO GET PREGNANT: NO
DO YOU HAVE ANY NEW OR CHANGING LESIONS: NO
DO YOU USE SUNSCREEN: DAILY
ARE YOU ON BIRTH CONTROL: YES
DO YOU USE A TANNING BED: YES, PREVIOUSLY
HAVE YOU HAD OR DO YOU HAVE A STAPH INFECTION: NO
WHAT TYPE OF BIRTH CONTROL: MIRENA
DO YOU HAVE IRREGULAR MENSTRUAL CYCLES: NO

## 2023-12-04 ASSESSMENT — ITCH NUMERIC RATING SCALE: HOW SEVERE IS YOUR ITCHING?: 0

## 2023-12-04 ASSESSMENT — PATIENT GLOBAL ASSESSMENT (PGA): PATIENT GLOBAL ASSESSMENT: PATIENT GLOBAL ASSESSMENT:  1 - CLEAR

## 2023-12-04 NOTE — PROGRESS NOTES
Subjective   Adriana Gutierrez is a 38 y.o. female who presents for the following: Excision (AJMN on left thigh - anterior).      Objective   Well appearing patient in no apparent distress; mood and affect are within normal limits.    A focused examination was performed including left thigh - anterior. All findings within normal limits unless otherwise noted below.    Left Thigh - Anterior  Is a 0.7 x 0.5 cm scar              Assessment/Plan   Neoplasm of uncertain behavior of skin  Left Thigh - Anterior    Skin excision    Lesion length (cm):  0.7  Lesion width (cm):  0.5  Margin per side (cm):  0.3  Total excision diameter (cm):  1.3  Informed consent: discussed and consent obtained    Timeout: patient name, date of birth, surgical site, and procedure verified    Procedure prep:  Patient was prepped and draped  Anesthesia: the lesion was anesthetized in a standard fashion    Anesthetic:  1% lidocaine w/ epinephrine 1-100,000 local infiltration  Instrument used: #15 blade    Hemostasis achieved with: suture and electrodesiccation    Outcome: patient tolerated procedure well with no complications    Post-procedure details: sterile dressing applied and wound care instructions given    Dressing type: pressure dressing, Steri-Strips, Telfa pad and Hypafix    Additional details:  The possible diagnoses were explained. Although the lesion is likely benign, the patient requests removal of the lesion because of the symptoms it is causing. Excision was discussed with the patient. The risks, benefits and potential adverse effects were reviewed. Discussion included but was not limited to the cure rate, relative cost, wound care requirements, activity restrictions, likely scar outcome and time to heal were reviewed. Alternative options including monitoring the lesion were discussed. The patient elected to proceed with excision.     Skin repair  Complexity:  Intermediate  Final length (cm):  5.3  Informed consent: discussed and  consent obtained    Timeout: patient name, date of birth, surgical site, and procedure verified    Procedure prep:  Patient prepped in sterile fashion  Anesthesia: the lesion was anesthetized in a standard fashion    Anesthetic:  1% lidocaine w/ epinephrine 1-100,000 local infiltration  Reason for type of repair: enhance both functionality and cosmetic results    Undermining: edges undermined    Subcutaneous layers (deep stitches):   Suture size:  3-0  Suture type: Vicryl (polyglactin 910)    Stitches:  Buried vertical mattress  Fine/surface layer approximation (top stitches):   Suture size:  3-0  Suture type: Prolene (polypropylene)    Stitches: vertical mattress and running subcuticular    Suture removal (days):  14  Hemostasis achieved with: electrodesiccation  Outcome: patient tolerated procedure well with no complications    Post-procedure details: sterile dressing applied and wound care instructions given    Dressing type: pressure dressing      Staff Communication: Dermatology Local Anesthesia: 1 % Lidocaine / Epinephrine - Amount:  3cc    Specimen 1 - Dermatopathology- DERM LAB  Differential Diagnosis: AJMN  Check Margins Yes/No?:  Yes  Comments:    Dermpath Lab: Routine Histopathology (formalin-fixed tissue)    12/4/2023 11:34 AM  Irish Hernández MD  Dermatology Resident, PGY-4     I saw and evaluated the patient. I personally obtained the key and critical portions of the history and physical exam or was physically present for key and critical portions performed by the student/resident. I reviewed the student/resident's documentation and discussed the patient with the student/resident. I agree with the student/resident's medical decision making as documented in the note.

## 2023-12-05 ENCOUNTER — OFFICE VISIT (OUTPATIENT)
Dept: DERMATOLOGY | Facility: CLINIC | Age: 38
End: 2023-12-05
Payer: COMMERCIAL

## 2023-12-05 DIAGNOSIS — T81.31XA DISRUPTION OR DEHISCENCE OF CLOSURE OF SKIN, INITIAL ENCOUNTER: Primary | ICD-10-CM

## 2023-12-05 PROCEDURE — 99024 POSTOP FOLLOW-UP VISIT: CPT | Performed by: DERMATOLOGY

## 2023-12-05 PROCEDURE — 1036F TOBACCO NON-USER: CPT | Performed by: DERMATOLOGY

## 2023-12-05 NOTE — PROGRESS NOTES
Subjective   Adriana Gutierrez is a 38 y.o. female who presents for the following: Wound Check.    Post-operative visit  Adriana Gutierrez is a 38 y.o. female who presents for 1 day follow up after surgery performed by Dr. Baca on 12/4/2023 for a neoplasm of uncertain behavior of skin. The patient states that the sutures popped after she bent down. She heard that a popping sound and the next morning she felt pain and saw it had opened. She also mentioned that she lifted her dog last night that weighed more than 10lbs.         Objective   Well appearing patient in no apparent distress; mood and affect are within normal limits.          Left Thigh - Anterior  Dehisced site with minimal erythema            Assessment/Plan   Disruption or dehiscence of closure of skin, initial encounter  Left Thigh - Anterior    Skin repair - Left Thigh - Anterior  Complexity:  Intermediate  Final length (cm):  5  Informed consent: discussed and consent obtained    Timeout: patient name, date of birth, surgical site, and procedure verified    Procedure prep:  Patient prepped in sterile fashion  Anesthesia: the lesion was anesthetized in a standard fashion    Anesthetic:  1% lidocaine w/ epinephrine 1-100,000 local infiltration  Reason for type of repair: reduce the risk of dehiscence, infection, and necrosis and enhance both functionality and cosmetic results    Undermining: edges could be approximated without difficulty    Subcutaneous layers (deep stitches):   Suture size:  3-0  Suture type: Vicryl (polyglactin 910)    Stitches:  Buried vertical mattress  Fine/surface layer approximation (top stitches):   Suture size:  4-0  Suture type: Prolene (polypropylene)    Stitches: simple running    Suture removal (days):  14  Hemostasis achieved with: pressure and electrodesiccation  Outcome: patient tolerated procedure well with no complications    Post-procedure details: sterile dressing applied and wound care instructions given           Pt is a 89 y/o Female with pmhx of arthritis coming in with progressively worsening shortness of breath x1 week.

## 2023-12-07 LAB
LABORATORY COMMENT REPORT: NORMAL
PATH REPORT.FINAL DX SPEC: NORMAL
PATH REPORT.GROSS SPEC: NORMAL
PATH REPORT.MICROSCOPIC SPEC OTHER STN: NORMAL
PATH REPORT.RELEVANT HX SPEC: NORMAL
PATH REPORT.TOTAL CANCER: NORMAL

## 2023-12-08 RX ORDER — CEPHALEXIN 500 MG/1
500 CAPSULE ORAL 3 TIMES DAILY
Qty: 21 CAPSULE | Refills: 0 | Status: SHIPPED | OUTPATIENT
Start: 2023-12-08 | End: 2023-12-15

## 2023-12-11 NOTE — RESULT ENCOUNTER NOTE
Pt has been informed that the excision result has come back clear margins so no further tx is needed and to follow up for regular fbse.

## 2023-12-18 ENCOUNTER — CLINICAL SUPPORT (OUTPATIENT)
Dept: DERMATOLOGY | Facility: CLINIC | Age: 38
End: 2023-12-18
Payer: COMMERCIAL

## 2023-12-18 DIAGNOSIS — Z48.02 ENCOUNTER FOR REMOVAL OF SUTURES: ICD-10-CM

## 2023-12-18 PROCEDURE — 15854 REMOVAL SUTR&STAPL XREQ ANES: CPT | Performed by: DERMATOLOGY

## 2023-12-18 ASSESSMENT — DERMATOLOGY QUALITY OF LIFE (QOL) ASSESSMENT
RATE HOW BOTHERED YOU ARE BY EFFECTS OF YOUR SKIN PROBLEMS ON YOUR ACTIVITIES (EG, GOING OUT, ACCOMPLISHING WHAT YOU WANT, WORK ACTIVITIES OR YOUR RELATIONSHIPS WITH OTHERS): 3
RATE HOW BOTHERED YOU ARE BY EFFECTS OF YOUR SKIN PROBLEMS ON YOUR ACTIVITIES (EG, GOING OUT, ACCOMPLISHING WHAT YOU WANT, WORK ACTIVITIES OR YOUR RELATIONSHIPS WITH OTHERS): 3
RATE HOW EMOTIONALLY BOTHERED YOU ARE BY YOUR SKIN PROBLEM (FOR EXAMPLE, WORRY, EMBARRASSMENT, FRUSTRATION): 3
RATE HOW BOTHERED YOU ARE BY SYMPTOMS OF YOUR SKIN PROBLEM (EG, ITCHING, STINGING BURNING, HURTING OR SKIN IRRITATION): 5
RATE HOW EMOTIONALLY BOTHERED YOU ARE BY YOUR SKIN PROBLEM (FOR EXAMPLE, WORRY, EMBARRASSMENT, FRUSTRATION): 3
RATE HOW BOTHERED YOU ARE BY SYMPTOMS OF YOUR SKIN PROBLEM (EG, ITCHING, STINGING BURNING, HURTING OR SKIN IRRITATION): 5

## 2023-12-18 NOTE — PROGRESS NOTES
Subjective     Adriana Gutierrez is a 38 y.o. female who presents for the following: Suture / Staple Removal (Pt is here for suture removal from an excision completed 12/4/23 and dehiscence with closure completed 12/5/23. Pt notes steri strips came off sooner than she wanted and applied butterfly steri strips at home which caused some skin irritation and several small blisters near suture line.  Pt notes improvement.  Wound well approximated and no evidence of infection.  Sutures removed, vaseline and telfa applied. ).     Review of Systems:  No other skin or systemic complaints other than what is documented elsewhere in the note.    The following portions of the chart were reviewed this encounter and updated as appropriate:          Skin Cancer History  No skin cancer on file.      Specialty Problems          Dermatology Problems    Acne vulgaris    Hemangioma of skin and subcutaneous tissue    Keloid    Melanocytic nevi of right lower limb, including hip    Melanocytic nevi of trunk    Melanocytic nevi of unspecified upper limb, including shoulder    Neoplasm of uncertain behavior of skin    Other hypertrophic disorders of the skin    Other melanin hyperpigmentation    Personal history of malignant melanoma of skin    Scar condition and fibrosis of skin    Xerosis cutis    Contact dermatitis due to metal        Objective   Well appearing patient in no apparent distress; mood and affect are within normal limits.    A focused skin examination was performed. All findings within normal limits unless otherwise noted below.    Assessment/Plan   1. Encounter for removal of sutures  Left Thigh - Anterior    Suture Removal - Left Thigh - Anterior    Performed by: Tami Ventura LPN  Authorized by: Callie Baca MD  Consent: Verbal consent obtained.  Consent given by: patient  Patient understanding: patient states understanding of the procedure being performed  Patient identity confirmed: verbally with  patient  Wound Appearance: clean  Sutures removed: 4 interrupted sutures and 1 running subcuticular removed.  Post-removal: dressing applied  Facility: sutures placed in this facility  Patient tolerance: patient tolerated the procedure well with no immediate complications

## 2024-01-05 ENCOUNTER — HOSPITAL ENCOUNTER (OUTPATIENT)
Dept: GASTROENTEROLOGY | Facility: HOSPITAL | Age: 39
Setting detail: OUTPATIENT SURGERY
Discharge: HOME | End: 2024-01-05
Payer: COMMERCIAL

## 2024-01-05 ENCOUNTER — ANESTHESIA EVENT (OUTPATIENT)
Dept: GASTROENTEROLOGY | Facility: HOSPITAL | Age: 39
End: 2024-01-05
Payer: COMMERCIAL

## 2024-01-05 ENCOUNTER — ANESTHESIA (OUTPATIENT)
Dept: GASTROENTEROLOGY | Facility: HOSPITAL | Age: 39
End: 2024-01-05
Payer: COMMERCIAL

## 2024-01-05 VITALS
OXYGEN SATURATION: 99 % | TEMPERATURE: 96.4 F | DIASTOLIC BLOOD PRESSURE: 77 MMHG | WEIGHT: 180 LBS | SYSTOLIC BLOOD PRESSURE: 127 MMHG | BODY MASS INDEX: 33.99 KG/M2 | HEIGHT: 61 IN | HEART RATE: 83 BPM | RESPIRATION RATE: 16 BRPM

## 2024-01-05 DIAGNOSIS — K21.9 GASTROESOPHAGEAL REFLUX DISEASE WITHOUT ESOPHAGITIS: ICD-10-CM

## 2024-01-05 DIAGNOSIS — K92.1 HEMATOCHEZIA: ICD-10-CM

## 2024-01-05 LAB — HCG UR QL IA.RAPID: NEGATIVE

## 2024-01-05 PROCEDURE — 88305 TISSUE EXAM BY PATHOLOGIST: CPT | Performed by: PATHOLOGY

## 2024-01-05 PROCEDURE — 2500000004 HC RX 250 GENERAL PHARMACY W/ HCPCS (ALT 636 FOR OP/ED): Performed by: NURSE ANESTHETIST, CERTIFIED REGISTERED

## 2024-01-05 PROCEDURE — 88305 TISSUE EXAM BY PATHOLOGIST: CPT | Mod: TC,SUR,STJLAB | Performed by: STUDENT IN AN ORGANIZED HEALTH CARE EDUCATION/TRAINING PROGRAM

## 2024-01-05 PROCEDURE — 7100000010 HC PHASE TWO TIME - EACH INCREMENTAL 1 MINUTE: Performed by: STUDENT IN AN ORGANIZED HEALTH CARE EDUCATION/TRAINING PROGRAM

## 2024-01-05 PROCEDURE — 45378 DIAGNOSTIC COLONOSCOPY: CPT | Performed by: STUDENT IN AN ORGANIZED HEALTH CARE EDUCATION/TRAINING PROGRAM

## 2024-01-05 PROCEDURE — 2500000001 HC RX 250 WO HCPCS SELF ADMINISTERED DRUGS (ALT 637 FOR MEDICARE OP): Performed by: STUDENT IN AN ORGANIZED HEALTH CARE EDUCATION/TRAINING PROGRAM

## 2024-01-05 PROCEDURE — A43239 PR EDG TRANSORAL BIOPSY SINGLE/MULTIPLE: Performed by: NURSE ANESTHETIST, CERTIFIED REGISTERED

## 2024-01-05 PROCEDURE — 3700000001 HC GENERAL ANESTHESIA TIME - INITIAL BASE CHARGE: Performed by: STUDENT IN AN ORGANIZED HEALTH CARE EDUCATION/TRAINING PROGRAM

## 2024-01-05 PROCEDURE — A43239 PR EDG TRANSORAL BIOPSY SINGLE/MULTIPLE: Performed by: ANESTHESIOLOGY

## 2024-01-05 PROCEDURE — 2500000005 HC RX 250 GENERAL PHARMACY W/O HCPCS: Performed by: NURSE ANESTHETIST, CERTIFIED REGISTERED

## 2024-01-05 PROCEDURE — 7100000009 HC PHASE TWO TIME - INITIAL BASE CHARGE: Performed by: STUDENT IN AN ORGANIZED HEALTH CARE EDUCATION/TRAINING PROGRAM

## 2024-01-05 PROCEDURE — 2500000004 HC RX 250 GENERAL PHARMACY W/ HCPCS (ALT 636 FOR OP/ED): Performed by: ANESTHESIOLOGY

## 2024-01-05 PROCEDURE — 2500000004 HC RX 250 GENERAL PHARMACY W/ HCPCS (ALT 636 FOR OP/ED): Performed by: STUDENT IN AN ORGANIZED HEALTH CARE EDUCATION/TRAINING PROGRAM

## 2024-01-05 PROCEDURE — 3700000002 HC GENERAL ANESTHESIA TIME - EACH INCREMENTAL 1 MINUTE: Performed by: STUDENT IN AN ORGANIZED HEALTH CARE EDUCATION/TRAINING PROGRAM

## 2024-01-05 PROCEDURE — 43239 EGD BIOPSY SINGLE/MULTIPLE: CPT | Performed by: STUDENT IN AN ORGANIZED HEALTH CARE EDUCATION/TRAINING PROGRAM

## 2024-01-05 PROCEDURE — 81025 URINE PREGNANCY TEST: CPT | Performed by: STUDENT IN AN ORGANIZED HEALTH CARE EDUCATION/TRAINING PROGRAM

## 2024-01-05 RX ORDER — LIDOCAINE HYDROCHLORIDE 20 MG/ML
INJECTION, SOLUTION EPIDURAL; INFILTRATION; INTRACAUDAL; PERINEURAL AS NEEDED
Status: DISCONTINUED | OUTPATIENT
Start: 2024-01-05 | End: 2024-01-05

## 2024-01-05 RX ORDER — PROPOFOL 10 MG/ML
INJECTION, EMULSION INTRAVENOUS AS NEEDED
Status: DISCONTINUED | OUTPATIENT
Start: 2024-01-05 | End: 2024-01-05

## 2024-01-05 RX ORDER — ONDANSETRON HYDROCHLORIDE 2 MG/ML
INJECTION, SOLUTION INTRAVENOUS AS NEEDED
Status: DISCONTINUED | OUTPATIENT
Start: 2024-01-05 | End: 2024-01-05

## 2024-01-05 RX ORDER — FENTANYL CITRATE 50 UG/ML
50 INJECTION, SOLUTION INTRAMUSCULAR; INTRAVENOUS ONCE
Status: COMPLETED | OUTPATIENT
Start: 2024-01-05 | End: 2024-01-05

## 2024-01-05 RX ORDER — DEXTROMETHORPHAN/PSEUDOEPHED 2.5-7.5/.8
DROPS ORAL AS NEEDED
Status: COMPLETED | OUTPATIENT
Start: 2024-01-05 | End: 2024-01-05

## 2024-01-05 RX ORDER — SODIUM CHLORIDE, SODIUM LACTATE, POTASSIUM CHLORIDE, CALCIUM CHLORIDE 600; 310; 30; 20 MG/100ML; MG/100ML; MG/100ML; MG/100ML
20 INJECTION, SOLUTION INTRAVENOUS CONTINUOUS
Status: DISCONTINUED | OUTPATIENT
Start: 2024-01-05 | End: 2024-01-06 | Stop reason: HOSPADM

## 2024-01-05 RX ADMIN — PROPOFOL 100 MG: 10 INJECTION, EMULSION INTRAVENOUS at 15:19

## 2024-01-05 RX ADMIN — PROPOFOL 50 MG: 10 INJECTION, EMULSION INTRAVENOUS at 15:31

## 2024-01-05 RX ADMIN — PROPOFOL 50 MG: 10 INJECTION, EMULSION INTRAVENOUS at 15:22

## 2024-01-05 RX ADMIN — ONDANSETRON 4 MG: 2 INJECTION INTRAMUSCULAR; INTRAVENOUS at 15:14

## 2024-01-05 RX ADMIN — PROPOFOL 50 MG: 10 INJECTION, EMULSION INTRAVENOUS at 15:28

## 2024-01-05 RX ADMIN — FENTANYL CITRATE 50 MCG: 50 INJECTION, SOLUTION INTRAMUSCULAR; INTRAVENOUS at 15:14

## 2024-01-05 RX ADMIN — PROPOFOL 50 MG: 10 INJECTION, EMULSION INTRAVENOUS at 15:40

## 2024-01-05 RX ADMIN — PROPOFOL 50 MG: 10 INJECTION, EMULSION INTRAVENOUS at 15:35

## 2024-01-05 RX ADMIN — PROPOFOL 50 MG: 10 INJECTION, EMULSION INTRAVENOUS at 15:45

## 2024-01-05 RX ADMIN — PROPOFOL 50 MG: 10 INJECTION, EMULSION INTRAVENOUS at 15:38

## 2024-01-05 RX ADMIN — PROPOFOL 50 MG: 10 INJECTION, EMULSION INTRAVENOUS at 15:49

## 2024-01-05 RX ADMIN — SODIUM CHLORIDE, SODIUM LACTATE, POTASSIUM CHLORIDE, AND CALCIUM CHLORIDE: 600; 310; 30; 20 INJECTION, SOLUTION INTRAVENOUS at 15:16

## 2024-01-05 RX ADMIN — PROPOFOL 50 MG: 10 INJECTION, EMULSION INTRAVENOUS at 15:42

## 2024-01-05 RX ADMIN — PROPOFOL 50 MG: 10 INJECTION, EMULSION INTRAVENOUS at 15:25

## 2024-01-05 RX ADMIN — SODIUM CHLORIDE, POTASSIUM CHLORIDE, SODIUM LACTATE AND CALCIUM CHLORIDE 20 ML/HR: 600; 310; 30; 20 INJECTION, SOLUTION INTRAVENOUS at 13:41

## 2024-01-05 RX ADMIN — PROPOFOL 50 MG: 10 INJECTION, EMULSION INTRAVENOUS at 15:20

## 2024-01-05 RX ADMIN — FENTANYL CITRATE 50 MCG: 50 INJECTION, SOLUTION INTRAMUSCULAR; INTRAVENOUS at 15:19

## 2024-01-05 RX ADMIN — LIDOCAINE HYDROCHLORIDE 100 MG: 20 INJECTION, SOLUTION EPIDURAL; INFILTRATION; INTRACAUDAL; PERINEURAL at 15:19

## 2024-01-05 RX ADMIN — PROPOFOL 50 MG: 10 INJECTION, EMULSION INTRAVENOUS at 15:33

## 2024-01-05 RX ADMIN — SIMETHICONE 333 MG: 20 EMULSION ORAL at 15:44

## 2024-01-05 RX ADMIN — PROPOFOL 50 MG: 10 INJECTION, EMULSION INTRAVENOUS at 15:47

## 2024-01-05 RX ADMIN — PROPOFOL 50 MG: 10 INJECTION, EMULSION INTRAVENOUS at 15:24

## 2024-01-05 SDOH — HEALTH STABILITY: MENTAL HEALTH: CURRENT SMOKER: 0

## 2024-01-05 ASSESSMENT — PAIN - FUNCTIONAL ASSESSMENT
PAIN_FUNCTIONAL_ASSESSMENT: 0-10
PAIN_FUNCTIONAL_ASSESSMENT: 0-10

## 2024-01-05 ASSESSMENT — COLUMBIA-SUICIDE SEVERITY RATING SCALE - C-SSRS
6. HAVE YOU EVER DONE ANYTHING, STARTED TO DO ANYTHING, OR PREPARED TO DO ANYTHING TO END YOUR LIFE?: NO
1. IN THE PAST MONTH, HAVE YOU WISHED YOU WERE DEAD OR WISHED YOU COULD GO TO SLEEP AND NOT WAKE UP?: NO
2. HAVE YOU ACTUALLY HAD ANY THOUGHTS OF KILLING YOURSELF?: NO

## 2024-01-05 ASSESSMENT — PAIN SCALES - GENERAL
PAINLEVEL_OUTOF10: 6
PAINLEVEL_OUTOF10: 0 - NO PAIN

## 2024-01-05 NOTE — ANESTHESIA POSTPROCEDURE EVALUATION
Patient: Adriana Gutierrez    Procedure Summary       Date: 01/05/24 Room / Location: [facility]    Anesthesia Start: 1514 Anesthesia Stop: 1600    Procedures:       COLONOSCOPY      EGD Diagnosis:       Hematochezia      Gastroesophageal reflux disease without esophagitis    Scheduled Providers: Lubna Brooks MD Responsible Provider: Chilo Ferreira MD    Anesthesia Type: MAC ASA Status: 2            Anesthesia Type: MAC    Vitals Value Taken Time   /77 01/05/24 1600   Temp 36 01/05/24 1600   Pulse 79 01/05/24 1600   Resp 15 01/05/24 1600   sp02 99 01/05/24 1600       Anesthesia Post Evaluation    Patient location during evaluation: PACU  Patient participation: complete - patient participated  Level of consciousness: awake and alert  Pain management: adequate  Airway patency: patent  Cardiovascular status: acceptable  Respiratory status: acceptable  Hydration status: acceptable  Postoperative Nausea and Vomiting: none        There were no known notable events for this encounter.

## 2024-01-05 NOTE — Clinical Note
Pre- Reflux, Hematachezia  EGD with bx,   Colonoscopy  Post- Hiatal Hernia, Esophagitis, Small Hemorrhoids

## 2024-01-05 NOTE — ANESTHESIA PREPROCEDURE EVALUATION
Patient: Adriana Gutierrez    Procedure Information       Date/Time: 01/05/24 1345    Scheduled providers: Lubna Brooks MD    Procedures:       COLONOSCOPY      EGD    Location: [facility]            Relevant Problems   Neuro/Psych   (+) Depression, recurrent (CMS/HCC)   (+) AGUSTINA (generalized anxiety disorder)   (+) Panic attacks      Musculoskeletal   (+) Chondromalacia of left patella   (+) Chondromalacia of right patella       Clinical information reviewed:   Tobacco  Allergies  Meds   Med Hx  Surg Hx   Fam Hx  Soc Hx        NPO Detail:  No data recorded     Physical Exam    Airway  Mallampati: I  TM distance: >3 FB  Neck ROM: full     Cardiovascular - normal exam     Dental - normal exam     Pulmonary - normal exam     Abdominal - normal exam         There were no vitals filed for this visit.    Past Surgical History:   Procedure Laterality Date    BREAST SURGERY  March 19, 2021    DILATION AND CURETTAGE OF UTERUS  12/15/2014    Dilation And Curettage    MOUTH SURGERY  12/15/2014    Oral Surgery Tooth Extraction    OTHER SURGICAL HISTORY  09/03/2021    Breast reduction    OTHER SURGICAL HISTORY  09/03/2021    Excision melanoma    SKIN BIOPSY      WISDOM TOOTH EXTRACTION  2009     Past Medical History:   Diagnosis Date    Acne     ADHD (attention deficit hyperactivity disorder)     Allergic     Anxiety     Depression     Encounter for screening for infections with a predominantly sexual mode of transmission 02/15/2018    Routine screening for STI (sexually transmitted infection)    GERD (gastroesophageal reflux disease)     Headache     Heart murmur     Melanoma (CMS/HCC) 10/2019    Other specified health status 02/15/2018    Contraception    Personal history of other diseases of the female genital tract 02/15/2018    History of pelvic inflammatory disease    Personal history of other infectious and parasitic diseases 12/15/2014    History of condyloma acuminatum    Urticaria        Current  Outpatient Medications:     hydrOXYzine HCL (Atarax) 25 mg tablet, Take 1 tablet (25 mg) by mouth 4 times a day as needed., Disp: , Rfl:     levonorgestrel (Mirena) 21 mcg/24 hours (8 yrs) 52 mg IUD, by intrauterine route., Disp: , Rfl:     lisdexamfetamine (Vyvanse) 40 mg capsule, Take 1 capsule (40 mg) by mouth once daily in the morning., Disp: , Rfl:     omeprazole (PriLOSEC) 40 mg DR capsule, Take 1 capsule (40 mg) by mouth once daily in the morning. Take before meals. Do not crush or chew., Disp: 30 capsule, Rfl: 1    sertraline 200 mg capsule, Take 200 mg by mouth once daily., Disp: , Rfl:     Current Facility-Administered Medications:     lactated Ringer's infusion, 20 mL/hr, intravenous, Continuous, Lubna Brooks MD  Prior to Admission medications    Medication Sig Start Date End Date Taking? Authorizing Provider   hydrOXYzine HCL (Atarax) 25 mg tablet Take 1 tablet (25 mg) by mouth 4 times a day as needed. 10/15/19   Historical Provider, MD   levonorgestrel (Mirena) 21 mcg/24 hours (8 yrs) 52 mg IUD by intrauterine route. 7/12/19   Historical Provider, MD   lisdexamfetamine (Vyvanse) 40 mg capsule Take 1 capsule (40 mg) by mouth once daily in the morning.    Historical Provider, MD   omeprazole (PriLOSEC) 40 mg DR capsule Take 1 capsule (40 mg) by mouth once daily in the morning. Take before meals. Do not crush or chew. 11/3/23 11/2/24  Madisyn Evangelista, DO   sertraline 200 mg capsule Take 200 mg by mouth once daily.    Historical Provider, MD     Allergies   Allergen Reactions    Doxycycline GI Upset     Social History     Tobacco Use    Smoking status: Former     Types: Cigarettes    Smokeless tobacco: Never   Substance Use Topics    Alcohol use: Not Currently     Comment: maybe once a month         Chemistry    Lab Results   Component Value Date/Time     11/03/2023 1237    K 4.4 11/03/2023 1237     11/03/2023 1237    CO2 30 11/03/2023 1237    BUN 12 11/03/2023 1237    CREATININE 0.74  "11/03/2023 1237    Lab Results   Component Value Date/Time    CALCIUM 9.2 11/03/2023 1237    ALKPHOS 50 11/03/2023 1237    AST 12 11/03/2023 1237    ALT 11 11/03/2023 1237    BILITOT 0.3 11/03/2023 1237          Lab Results   Component Value Date/Time    WBC 8.0 11/03/2023 1237    HGB 12.6 11/03/2023 1237    HCT 39.1 11/03/2023 1237     11/03/2023 1237     No results found for: \"PROTIME\", \"PTT\", \"INR\"  No results found for this or any previous visit (from the past 4464 hour(s)).     Anesthesia Plan    ASA 2     MAC     The patient is not a current smoker.    intravenous induction   Anesthetic plan and risks discussed with patient.    Plan discussed with CRNA.      "

## 2024-01-05 NOTE — H&P
Procedure H&P    Patient Profile-Procedures  Name Adriana Gutierrez  Date of Birth 1985  MRN [id number]  Address   [address]  [address]  [facility] [id number]  [address]  [facility] [id number]    Primary Phone Number [phone]  Secondary Phone Number    Madisyn Pandya    Procedure(s):  Procedures: EGD and Colonoscopy  Primary contact name and number   Extended Emergency Contact Information  Primary Emergency Contact: Sakina Gutierrez  Address: [address] [place] [address] of [place]  Mobile Phone: [phone]  Relation: Mother    General Health  Weight   Vitals:    01/05/24 1325   Weight: 81.6 kg (180 lb)     BMI Body mass index is 34.01 kg/m².    Allergies  Allergies   Allergen Reactions    Doxycycline GI Upset       Past Medical History   Past Medical History:   Diagnosis Date    Acne     ADHD (attention deficit hyperactivity disorder)     Allergic     Anxiety     Depression     Encounter for screening for infections with a predominantly sexual mode of transmission 02/15/2018    Routine screening for STI (sexually transmitted infection)    GERD (gastroesophageal reflux disease)     Headache     Heart murmur     Melanoma (CMS/HCC) 10/2019    Other specified health status 02/15/2018    Contraception    Personal history of other diseases of the female genital tract 02/15/2018    History of pelvic inflammatory disease    Personal history of other infectious and parasitic diseases 12/15/2014    History of condyloma acuminatum    PONV (postoperative nausea and vomiting)     Urticaria        Provider assessment  Diagnosis:  hematochezia and hematemesis  Medication Reviewed - yes  Prior to Admission medications    Medication Sig Start Date End Date Taking? Authorizing Provider   hydrOXYzine HCL (Atarax) 25 mg tablet Take 1 tablet (25 mg) by mouth once daily at bedtime. 10/15/19  Yes Historical Provider, MD   levonorgestrel (Mirena) 21 mcg/24 hours (8 yrs) 52  mg IUD by intrauterine route. 7/12/19  Yes Historical Provider, MD   lisdexamfetamine (Vyvanse) 40 mg capsule Take 1 capsule (40 mg) by mouth once daily in the morning.   Yes Historical Provider, MD   sertraline 200 mg capsule Take 200 mg by mouth once daily.   Yes Historical Provider, MD   omeprazole (PriLOSEC) 40 mg DR capsule Take 1 capsule (40 mg) by mouth once daily in the morning. Take before meals. Do not crush or chew. 11/3/23 1/5/24  Madisyn Evangelista, DO       Physical Exam  Vitals:    01/05/24 1325   BP: 128/71   Pulse: 75   Resp: 16   Temp: 36.2 °C (97.2 °F)   SpO2: 99%        General: A&Ox3, NAD.  HEENT: AT/NC.   CV: RRR. No murmur.  Resp: CTA bilaterally. No wheezing, rhonchi or rales.   GI: Soft, NT/ND. BSx4.  Extrem: No edema. Pulses intact.  Skin: No Jaundice.   Neuro: No focal deficits.   Psych: Normal mood and affect.      Procedure Plan - pre-procedural (re)assesment completed by physician:  discharge/transfer patient when discharge criteria met    Lubna Brooks MD  1/5/2024 3:06 PM

## 2024-01-10 LAB
LABORATORY COMMENT REPORT: NORMAL
PATH REPORT.FINAL DX SPEC: NORMAL
PATH REPORT.GROSS SPEC: NORMAL
PATH REPORT.RELEVANT HX SPEC: NORMAL
PATH REPORT.TOTAL CANCER: NORMAL

## 2024-01-24 ENCOUNTER — OFFICE VISIT (OUTPATIENT)
Dept: GASTROENTEROLOGY | Facility: CLINIC | Age: 39
End: 2024-01-24
Payer: COMMERCIAL

## 2024-01-24 VITALS
HEIGHT: 61 IN | DIASTOLIC BLOOD PRESSURE: 76 MMHG | SYSTOLIC BLOOD PRESSURE: 148 MMHG | WEIGHT: 196.8 LBS | OXYGEN SATURATION: 99 % | BODY MASS INDEX: 37.16 KG/M2 | HEART RATE: 78 BPM | RESPIRATION RATE: 18 BRPM | TEMPERATURE: 98.1 F

## 2024-01-24 DIAGNOSIS — K21.9 GASTROESOPHAGEAL REFLUX DISEASE WITHOUT ESOPHAGITIS: Primary | ICD-10-CM

## 2024-01-24 DIAGNOSIS — F33.9 DEPRESSION, RECURRENT (CMS-HCC): ICD-10-CM

## 2024-01-24 DIAGNOSIS — K44.9 HIATAL HERNIA: ICD-10-CM

## 2024-01-24 DIAGNOSIS — F41.1 GAD (GENERALIZED ANXIETY DISORDER): ICD-10-CM

## 2024-01-24 DIAGNOSIS — Z79.899 LONG-TERM CURRENT USE OF PROTON PUMP INHIBITOR THERAPY: ICD-10-CM

## 2024-01-24 PROCEDURE — 99215 OFFICE O/P EST HI 40 MIN: CPT | Performed by: STUDENT IN AN ORGANIZED HEALTH CARE EDUCATION/TRAINING PROGRAM

## 2024-01-24 PROCEDURE — 1036F TOBACCO NON-USER: CPT | Performed by: STUDENT IN AN ORGANIZED HEALTH CARE EDUCATION/TRAINING PROGRAM

## 2024-01-24 RX ORDER — OMEPRAZOLE 20 MG/1
20 TABLET, DELAYED RELEASE ORAL
Qty: 30 TABLET | Refills: 11 | Status: SHIPPED | OUTPATIENT
Start: 2024-01-24 | End: 2024-03-18

## 2024-01-24 NOTE — PROGRESS NOTES
Subjective     History of Present Illness:   Adriana Gutierrez is a 38 y.o. female with hx of AGUSTINA, depression, GERD and binge eating disorder who presents to clinic for episode of resolved hematemesis and  blood streaked stool.    She completed upper endoscopy and colonoscopy for these findings that was only notable for small hemorrhoids, LA Grade B esophagitis and HH. Biopsies were negative for Hpylori. She has not had any GIB since her last evaluation.    She does have episodes of heartburn occurring daily. She was previously on omeprazole but stopped that and has been using mylanta.       With regards to her prior hx,  She states one month ago had worsening nausea associated with several episodes of blood-streaked emesis that resolved on its own.  The following day she was started on omeprazole and has been on this since.  She has not had recurrence of the symptoms since.  She has not had prior hematemesis in the past.  She does endorse a history of heartburn though this is sporadic and occurs every other month on average.  She has not otherwise had any weight loss.  She states her bowel movements are regular and she has 1 soft bowel movement a day.  She does endorse episodes of blood-streaked  over the past month as well, this does not fill the toilet bowel and is usually surrounding the stool or on the toilet paper.  She has no abdominal pain.  She has no family history of GI malignancy.      Past Medical History   has a past medical history of Acne, ADHD (attention deficit hyperactivity disorder), Allergic, Anxiety, Depression, Encounter for screening for infections with a predominantly sexual mode of transmission (02/15/2018), GERD (gastroesophageal reflux disease), Headache, Heart murmur, Melanoma (CMS/[facility]) (10/2019), Other specified health status (02/15/2018), Personal history of other diseases of the female genital tract (02/15/2018), Personal history of other infectious and parasitic diseases (12/15/2014),  PONV (postoperative nausea and vomiting), and Urticaria.     Social History   reports that she quit smoking about 3 years ago. Her smoking use included cigarettes. She has never used smokeless tobacco. She reports that she does not currently use alcohol. She reports that she does not use drugs.     Family History  family history includes Allergies in an other family member; Arthritis in her maternal grandmother and mother; Cancer in her father's sister; Diabetes in her mother; Eczema in her sister; Hypertension in her mother; Rashes / Skin problems in her sister; Stroke in her paternal grandmother.     Allergies  No Known Allergies      Medications  Current Outpatient Medications   Medication Instructions    hydrOXYzine HCL (ATARAX) 25 mg, oral, Nightly    levonorgestrel (Mirena) 21 mcg/24 hours (8 yrs) 52 mg IUD intrauterine    lisdexamfetamine (VYVANSE) 40 mg, oral, Every morning    omeprazole OTC (PRILOSEC OTC) 20 mg, oral, Daily before breakfast, Do not crush, chew, or split.    sertraline 200 mg, oral, Daily        Objective   Visit Vitals  /76 (BP Location: Left arm, Patient Position: Sitting, BP Cuff Size: Large adult)   Pulse 78   Temp 36.7 °C (98.1 °F) (Temporal)   Resp 18      Physical Exam  Constitutional:       General: She is not in acute distress.     Appearance: Normal appearance.   HENT:      Head: Normocephalic and atraumatic.      Mouth/Throat:      Mouth: Mucous membranes are moist.   Eyes:      Extraocular Movements: Extraocular movements intact.   Pulmonary:      Effort: Pulmonary effort is normal.      Breath sounds: No stridor. No wheezing.   Abdominal:      General: There is no distension.   Musculoskeletal:         General: Normal range of motion.   Skin:     General: Skin is warm and dry.   Neurological:      General: No focal deficit present.      Mental Status: She is alert.   Psychiatric:         Mood and Affect: Mood normal.         Judgment: Judgment normal.       Assessment/Plan    Adriana Gutierrez is a 38 y.o. female with hx of AGUSTINA, depression, GERD and binge eating disorder who presents to clinic for episode of resolved hematemesis and  blood streaked stool.   She completed upper endoscopy and colonoscopy for these findings that was only notable for small hemorrhoids. LA grade B esophagitis, HH.  Biopsies were negative for Hpylori.     Symptoms are suggestive of rectal outlet bleeding.  She reassuringly has normal hemoglobin levels.  No alarm signs otherwise.  Discussed with patient risk factors of uncontrolled GERD including ongoing esophagitis, stricture, food impaction. Given daily heartburn and HH benefits of PPI outweigh risks. Discussed need to monitor Ca and Mg annually. May start multivitamin with Ca supplement and increase strength training to help mitigate risk of osteopenia.     Patient agreeable with plan and all questions answered.       Problem List Items Addressed This Visit          Mental Health    Depression, recurrent (CMS/HCC)    AGUSTINA (generalized anxiety disorder)     Other Visit Diagnoses       Gastroesophageal reflux disease without esophagitis    -  Primary    Relevant Medications    omeprazole OTC (PriLOSEC OTC) 20 mg EC tablet    Long-term current use of proton pump inhibitor therapy        Relevant Orders    Magnesium    Hiatal hernia                       Lubna Brooks MD         My final recommendations will be communicated back to the requesting physician by way of shared Medical record or letter to requesting physician via fax.

## 2024-03-16 DIAGNOSIS — K21.9 GASTROESOPHAGEAL REFLUX DISEASE WITHOUT ESOPHAGITIS: ICD-10-CM

## 2024-03-18 RX ORDER — OMEPRAZOLE 20 MG/1
CAPSULE, DELAYED RELEASE ORAL
Qty: 90 CAPSULE | Refills: 4 | Status: SHIPPED | OUTPATIENT
Start: 2024-03-18

## 2024-03-18 NOTE — PROGRESS NOTES
"Subjective   Patient ID:   [id number]   Adriana Gutierrez is a 39 y.o. female who presents for Allergies (NPV, nasal congested and pnd).    Chief Complaint   Patient presents with    Allergies     NPV, nasal congested and pnd        Since last visit, 2-1-22, patient's boyfriend told her she was having apneic events.  She actually woke up one time and he believes she has sleep apnea.  Patient admits to chronic PND that she has to \"force\" down her throat but cannot always spit it out.  It is white to yellow when she can get it out.  She does snore loudly and is wondering if she should see Sleep Medicine for a sleep study.    Patient lived in [place] and had a \"balloon\" sinuplasty at [facility], but feels no improvement.  She was told she had a bone spur and septal deviation due to allergies, per patient.  She had scans there but not sure.  She uses Flonase for severe Sx but wants to avoid medications because she was on so many in the past.  Flonase helps minimally.            Review of Systems   HENT:          Noisy snoring and witnessed apneic episodes per .   Psychiatric/Behavioral:  Positive for sleep disturbance.        Objective     /85   Pulse 67   Wt 83.9 kg (185 lb)   SpO2 97%   BMI 34.96 kg/m²      Physical Exam  Constitutional:       Appearance: Normal appearance.   HENT:      Head: Normocephalic and atraumatic.      Right Ear: External ear normal. There is no impacted cerumen.      Left Ear: External ear normal. There is no impacted cerumen.      Nose: No congestion or rhinorrhea.   Eyes:      Extraocular Movements: Extraocular movements intact.      Conjunctiva/sclera: Conjunctivae normal.      Pupils: Pupils are equal, round, and reactive to light.   Cardiovascular:      Rate and Rhythm: Normal rate and regular rhythm.      Heart sounds: No murmur heard.     No friction rub. No gallop.   Pulmonary:      Effort: No respiratory distress.      Breath sounds: No wheezing, " rhonchi or rales.   Skin:     General: Skin is warm and dry.   Neurological:      Mental Status: She is alert.   Psychiatric:         Mood and Affect: Mood normal.         Behavior: Behavior normal.       Assessment/Plan     Allergic rhinitis  Complains of congestion, but she is not on  any meds. She thought she was taking too many and didn't know what help so she stopped them all. I want her to restart Flonase 2 sprays each nostril daily    Witnessed episode of apnea  Home sleep evaluation      By signing my name below, I, Arlene Uribe, attest that this documentation has been prepared under the direction and in the presence of Fanta Sam MD.  All medical record entries made by the Scribe were at my direction and personally dictated by me. I have reviewed the chart and agree that the record accurately reflects my personal performance of the history, physical exam, discussion and plan.

## 2024-03-19 ENCOUNTER — OFFICE VISIT (OUTPATIENT)
Dept: ALLERGY | Facility: CLINIC | Age: 39
End: 2024-03-19
Payer: COMMERCIAL

## 2024-03-19 VITALS
WEIGHT: 185 LBS | DIASTOLIC BLOOD PRESSURE: 85 MMHG | OXYGEN SATURATION: 97 % | SYSTOLIC BLOOD PRESSURE: 145 MMHG | BODY MASS INDEX: 34.96 KG/M2 | HEART RATE: 67 BPM

## 2024-03-19 DIAGNOSIS — R06.81 WITNESSED EPISODE OF APNEA: ICD-10-CM

## 2024-03-19 DIAGNOSIS — J30.9 ALLERGIC RHINITIS, UNSPECIFIED SEASONALITY, UNSPECIFIED TRIGGER: Primary | ICD-10-CM

## 2024-03-19 PROCEDURE — 99214 OFFICE O/P EST MOD 30 MIN: CPT | Performed by: ALLERGY & IMMUNOLOGY

## 2024-03-19 PROCEDURE — 1036F TOBACCO NON-USER: CPT | Performed by: ALLERGY & IMMUNOLOGY

## 2024-03-19 RX ORDER — FLUTICASONE PROPIONATE 50 MCG
2 SPRAY, SUSPENSION (ML) NASAL DAILY
Qty: 16 G | Refills: 1 | Status: SHIPPED | OUTPATIENT
Start: 2024-03-19 | End: 2024-06-17

## 2024-03-19 ASSESSMENT — ENCOUNTER SYMPTOMS: SLEEP DISTURBANCE: 1

## 2024-03-19 NOTE — ASSESSMENT & PLAN NOTE
Complains of congestion, but she is not on  any meds. She thought she was taking too many and didn't know what help so she stopped them all. I want her to restart Flonase 2 sprays each nostril daily

## 2024-03-19 NOTE — PATIENT INSTRUCTIONS
Start over the counter Flonase 2 sprays each side one time a day.  To increase the efficacy of your nasal spray, be sure to look down while using it.?  Spray slightly away from the direction of your nasal septum (the bone in the middle of your nose) and only sniff after you have sprayed - avoid spraying and sniffing at the same time, or else a lot of spray will go down your throat.    Stop Allegra.    You will be called for delivery of home sleep study equipment.  We will call you with results, recommendations and followup plan.    Record release completed for [facility], [address] [place] [address], [address], [place] [address].  [phone].   Fax: [phone]

## 2024-04-06 ENCOUNTER — CLINICAL SUPPORT (OUTPATIENT)
Dept: SLEEP MEDICINE | Facility: HOSPITAL | Age: 39
End: 2024-04-06
Payer: COMMERCIAL

## 2024-04-06 DIAGNOSIS — R06.81 WITNESSED EPISODE OF APNEA: ICD-10-CM

## 2024-04-06 PROCEDURE — 95806 SLEEP STUDY UNATT&RESP EFFT: CPT | Performed by: PSYCHIATRY & NEUROLOGY

## 2024-04-16 DIAGNOSIS — G47.33 OBSTRUCTIVE SLEEP APNEA: Primary | ICD-10-CM

## 2024-04-24 ENCOUNTER — OFFICE VISIT (OUTPATIENT)
Dept: GASTROENTEROLOGY | Facility: CLINIC | Age: 39
End: 2024-04-24
Payer: COMMERCIAL

## 2024-04-24 VITALS
SYSTOLIC BLOOD PRESSURE: 132 MMHG | WEIGHT: 185 LBS | DIASTOLIC BLOOD PRESSURE: 80 MMHG | BODY MASS INDEX: 34.93 KG/M2 | HEIGHT: 61 IN | HEART RATE: 80 BPM

## 2024-04-24 DIAGNOSIS — K44.9 HIATAL HERNIA: ICD-10-CM

## 2024-04-24 DIAGNOSIS — K92.1 HEMATOCHEZIA: ICD-10-CM

## 2024-04-24 DIAGNOSIS — Z79.899 LONG-TERM CURRENT USE OF PROTON PUMP INHIBITOR THERAPY: Primary | ICD-10-CM

## 2024-04-24 DIAGNOSIS — K21.9 GASTROESOPHAGEAL REFLUX DISEASE WITHOUT ESOPHAGITIS: ICD-10-CM

## 2024-04-24 DIAGNOSIS — K92.0 HEMATEMESIS WITH NAUSEA: ICD-10-CM

## 2024-04-24 PROCEDURE — 1036F TOBACCO NON-USER: CPT | Performed by: STUDENT IN AN ORGANIZED HEALTH CARE EDUCATION/TRAINING PROGRAM

## 2024-04-24 PROCEDURE — 99213 OFFICE O/P EST LOW 20 MIN: CPT | Performed by: STUDENT IN AN ORGANIZED HEALTH CARE EDUCATION/TRAINING PROGRAM

## 2024-04-24 NOTE — PROGRESS NOTES
Subjective     History of Present Illness:   Adriana Gutierrez is a 39 y.o. female with hx of AGUSTINA, depression, GERD and binge eating disorder who presents to clinic for episode of resolved hematemesis and  blood streaked stool.    She completed upper endoscopy and colonoscopy for these findings that was only notable for small hemorrhoids, LA Grade B esophagitis and HH. Biopsies were negative for Hpylori. She has not had any GIB since her last evaluation.    She has had complete resolution of her symptoms including GERD since starting omperazole 20 mg daily. She feels overall well and has no complaints today. She takes a Mg gummy supplement.       With regards to her prior hx,  She states one month ago had worsening nausea associated with several episodes of blood-streaked emesis that resolved on its own.  The following day she was started on omeprazole and has been on this since.  She has not had recurrence of the symptoms since.  She has not had prior hematemesis in the past.  She does endorse a history of heartburn though this is sporadic and occurs every other month on average.  She has not otherwise had any weight loss.  She states her bowel movements are regular and she has 1 soft bowel movement a day.  She does endorse episodes of blood-streaked  over the past month as well, this does not fill the toilet bowel and is usually surrounding the stool or on the toilet paper.  She has no abdominal pain.  She has no family history of GI malignancy.      Past Medical History   has a past medical history of Acne, ADHD (attention deficit hyperactivity disorder), Allergic, Anxiety, Depression, Encounter for screening for infections with a predominantly sexual mode of transmission (02/15/2018), GERD (gastroesophageal reflux disease), Headache, Heart murmur, Melanoma (Multi) (10/2019), Other specified health status (02/15/2018), Personal history of other diseases of the female genital tract (02/15/2018), Personal history of  other infectious and parasitic diseases (12/15/2014), PONV (postoperative nausea and vomiting), and Urticaria.     Social History   reports that she quit smoking about 3 years ago. Her smoking use included cigarettes. She has never used smokeless tobacco. She reports that she does not currently use alcohol. She reports that she does not use drugs.     Family History  family history includes Allergies in an other family member; Arthritis in her maternal grandmother and mother; Cancer in her father's sister; Diabetes in her mother; Eczema in her sister; Hypertension in her mother; Rashes / Skin problems in her sister; Stroke in her paternal grandmother.     Allergies  No Known Allergies      Medications  Current Outpatient Medications   Medication Instructions    fluticasone (Flonase) 50 mcg/actuation nasal spray 2 sprays, Each Nostril, Daily, Shake gently. Before first use, prime pump. After use, clean tip and replace cap.    hydrOXYzine HCL (ATARAX) 25 mg, oral, Nightly    levonorgestrel (Mirena) 21 mcg/24 hours (8 yrs) 52 mg IUD intrauterine    lisdexamfetamine (VYVANSE) 40 mg, oral, Every morning    omeprazole (PriLOSEC) 20 mg DR capsule TAKE 1 WHOLE CAPSULE (20 MG) BY MOUTH DAILY IN THE MORNING-BEFORE A MEAL    sertraline 200 mg, oral, Daily        Objective   Visit Vitals  /80   Pulse 80      Physical Exam  Constitutional:       General: She is not in acute distress.     Appearance: Normal appearance.   HENT:      Head: Normocephalic and atraumatic.      Mouth/Throat:      Mouth: Mucous membranes are moist.   Eyes:      Extraocular Movements: Extraocular movements intact.   Pulmonary:      Effort: Pulmonary effort is normal.      Breath sounds: No stridor. No wheezing.   Abdominal:      General: There is no distension.   Musculoskeletal:         General: Normal range of motion.   Skin:     General: Skin is warm and dry.   Neurological:      General: No focal deficit present.      Mental Status: She is  alert.   Psychiatric:         Mood and Affect: Mood normal.         Judgment: Judgment normal.       Assessment/Plan   Adriana Gutierrez is a 38 y.o. female with hx of AGUSTINA, depression, GERD and binge eating disorder who presents to clinic for episode of resolved hematemesis and  blood streaked stool.   She completed upper endoscopy and colonoscopy for these findings that was only notable for small hemorrhoids. LA grade B esophagitis, HH.  Biopsies were negative for Hpylori.     Symptoms are suggestive of rectal outlet bleeding.  She reassuringly has normal hemoglobin levels.  No alarm signs otherwise.  Discussed with patient risk factors of uncontrolled GERD including ongoing esophagitis, stricture, food impaction. Given daily heartburn and HH benefits of PPI outweigh risks. Discussed need to monitor Ca and Mg annually. May start multivitamin with Ca supplement and increase strength training to help mitigate risk of osteopenia.     Patient agreeable with plan and all questions answered. She will return to clinic in one year given resolution of her symptoms.      Problem List Items Addressed This Visit    None  Visit Diagnoses       Long-term current use of proton pump inhibitor therapy    -  Primary    Gastroesophageal reflux disease without esophagitis        Hiatal hernia        Hematochezia        Hematemesis with nausea                         Lubna Brooks MD         My final recommendations will be communicated back to the requesting physician by way of shared Medical record or letter to requesting physician via fax.

## 2024-05-06 ENCOUNTER — OFFICE VISIT (OUTPATIENT)
Dept: SLEEP MEDICINE | Facility: CLINIC | Age: 39
End: 2024-05-06
Payer: COMMERCIAL

## 2024-05-06 VITALS
DIASTOLIC BLOOD PRESSURE: 85 MMHG | SYSTOLIC BLOOD PRESSURE: 129 MMHG | TEMPERATURE: 98 F | BODY MASS INDEX: 35.5 KG/M2 | HEIGHT: 61 IN | HEART RATE: 91 BPM | WEIGHT: 188 LBS | OXYGEN SATURATION: 98 %

## 2024-05-06 DIAGNOSIS — J30.89 ALLERGIC RHINITIS DUE TO OTHER ALLERGIC TRIGGER, UNSPECIFIED SEASONALITY: Primary | ICD-10-CM

## 2024-05-06 DIAGNOSIS — E66.9 OBESITY (BMI 35.0-39.9 WITHOUT COMORBIDITY): ICD-10-CM

## 2024-05-06 DIAGNOSIS — G47.33 OBSTRUCTIVE SLEEP APNEA: ICD-10-CM

## 2024-05-06 PROCEDURE — 1036F TOBACCO NON-USER: CPT | Performed by: PHYSICIAN ASSISTANT

## 2024-05-06 PROCEDURE — 99204 OFFICE O/P NEW MOD 45 MIN: CPT | Performed by: PHYSICIAN ASSISTANT

## 2024-05-06 NOTE — ASSESSMENT & PLAN NOTE
-Mild  -avoid supine sleep  -avoid drowsy driving  -exercise/healthy diet  -will see ENT, work on increasing night time sleep  -discussed options for CPAP and OAT, will let me know if she would like to try these but will otherwise hold off for now

## 2024-05-06 NOTE — PATIENT INSTRUCTIONS
[facility] Sleep Medicine  DO [address]  [address] [facility] [address] [address]       NAME: Adriana Gutierrez   DATE: 05/06/24    Your Sleep Provider Today: Trisha Forrest PA-C  Your Primary Care Physician: Madisyn Evangelista DO   Your Referring Provider: Fanta Sam MD    DIAGNOSIS:   1. Allergic rhinitis due to other allergic trigger, unspecified seasonality  Referral to ENT      2. Obstructive sleep apnea  Referral to Adult Sleep Medicine    Referral to ENT          Thank you for coming to the Sleep Medicine Clinic today! Your sleep medicine provider today was: Trisha Forrest PA-C Below is a summary of your treatment plan, other important information, and our contact numbers:      TREATMENT PLAN   -referral to ENT  -exercise/healthy diet  -please let me know if you would like to pursue oral appliance therapy or cpap trial  -avoid drowsy driving  -message me on mychart or call my office if you would like to try other treatment options before your appointment  -focus on trying to get 8 hours of sleep most nights    FOR QUESTIONS AND CONCERNS:   1. In case of difficulties with PAP device or mask interface, please FIRST contact your DME        (If using [employer]: [phone])  2. For questions concerning SLEEP CLINIC appointments, please call [phone].  3. Regarding SLEEP LAB scheduling, please call [phone] or [phone]    Obstructive Sleep Apnea (FERCHO) is a disorder characterized by recurrent apneas during sleep despite efforts to breath. It is due to upper airway obstruction. These respiratory pauses may induce high levels of carbon dioxide or low oxygen levels. Cardiac arrhythmia and elevation of blood pressure in the body and the lungs may occur. Frequent partial arousals occur during a nights sleep resulting in sleep deprivation and daytime sleepiness. It has been associated with an increased risk of cardiovascular disease, stroke,  hypertension, and insulin resistance.    RECOMMENDATIONS to help your sleep apnea include: losing weight if overweight, avoidance of sleeping on your back, avoidance of alcohol 2-3 hours before be      As we discussed, you have sleep apnea. We will order an CPAP for you which will set you up with your new PAP at home. This will be done by a [employer] ([email or web address]).    **Bring all equipment with you to follow-up appointments.**     **You will need to be seen day 31-90 days after CPAP set up.**    Insurance expects 4+ hours of use at least 70% of the time.         Sleep Tips:    1. Wake up and get out of bed the same time every day, even on weekends or non-work days. Whether you have good or poor sleep, waking up at the same time every day is important.      2. Go to bed when you are sleepy, but not before your goal bedtime. Long periods of time in bed will lead to fragmented, shallow, broken sleep.     3. Use the bed for sleep and intimacy only. Do not watch TV, eat, read or use phone/laptop in bed. Keeping sleep as the only activity in bed will help re-associate bed=sleep.    4. Get up when you can't sleep (greater than 15 minutes). When you are unable to sleep, exit the bed and go to another room or chair in bedroom, do something relaxing/distracting/non-stimulating until you feel sleepy enough to fall back asleep.     5. Avoid napping during the day. Napping, particularly in the late afternoon or early evening may interfere with your night's sleep.    6. Create a buffer zone. This is a quiet time prior to bedtime, typically 30-60 mins. is beneficial for most people. During this time, you should do things that are enjoyable, relaxing and not necessarily goal-oriented.    7. Don't worry or plan in bed. If you are worrying, planning, feeling anxious or can't shut off your thoughts, get up and stay out of bed until you have quieted your mind. Make a list, write your worries down for the morning  time.    8. Other Healthy Sleep Habits   -Turn the clock around   -Limit caffeine and consume before noon   -Limit alcohol and avoid within 2 hours of bedtime   -Exercise regularly but not close to bedtime   -Keep bedroom quiet, dark and cool   -Avoid eating a heavy meal close to bedtime       Follow-up Appointment:   3-4      IMPORTANT INFORMATION     Call 911 for medical emergencies.  Our offices are generally open from Monday-Friday, 9 am - 5 pm.  If you need to get in touch with me, you may either call me and my team(number is below) or you can use [employer].  If a referral for a test, for CPAP, or for another specialist was made, and you have not heard about scheduling this within a week, please call scheduling at [phone] ([phone]).  If you are unable to make your appointment for clinic or an overnight study, kindly call the office at least 48 hours in advance to cancel and reschedule.  If you are on CPAP, please bring your device's card or the device to each clinic appointment.   There are no supporting services by either the sleep doctors or their staff on weekends and Holidays, or after 5 PM on weekdays.   If you have been asked to come to a sleep study, make sure you bring toiletries, a comfy pillow, and any nighttime medications that you may regularly take. Also be sure to eat dinner before you arrive. We generally do not provide meals.      PRESCRIPTIONS     We require 7 days advanced notice for prescription refills. If we do not receive the request in this time, we cannot guarantee that your medication will be refilled in time.      IMPORTANT PHONE NUMBERS     Sleep Medicine Clinic Fax: [phone]  Appointments (for Adult Sleep Clinic): [phone] - option 2  Appointments (For Sleep Studies): [phone] - option 3  Behavioral Sleep Medicine: [phone]  Sleep Surgery: [phone]  ENT (Otolaryngology): [phone]  Headache Clinic (Neurology): [phone]  Neurology:  [phone]  Psychiatry: [phone]  Pulmonary Function Testing (PFT) Center: [phone]  Pulmonary Medicine: [phone]  [employer] (DME): [phone]  [employer] (DME): [phone]  [facility] ([facility] [phone]      OUR ADULT SLEEP MEDICINE TEAM   Please do not hesitate to call the office or sleep nurse with any questions between appointments:    Adult Sleep Nurses (Ghazala Lundberg, RN and Chelsy Amaya RN):  For clinical questions and refilling prescriptions: [phone]  Email sleep diaries and other documents at: [email or web address]    Adult Sleep Medicine Secretaries:  Callie Henning (For Derick/Raphael/Adriane/[place]guila/Echo/Baldomero):   P: [phone]  F: [phone]  Manju Kirby (For Dorado/Mary Jo): P: [phone]  Fax: [phone]  Sonia Powell (For Jonathan/Blank): P: [phone]  F: [phone]  Cristina Kendrick (For [place]): P: [phone]  F: [phone]  Laurie Curtis (For Carmen/Aliya/Zakhary): P: [phone]  F: [phone]  Charity Guthrie (For Campbell/Timothy): P: [phone]  F: [phone]     Adult Sleep Medicine Advanced Practice Providers:  Abhishek Vila ([place], [place])  Keiko Pisano ([facility])  Karen Camargo CNP ([place], [place], [place])  Evelina Forrest CNP ([place], [place], [place])  Leslie Todd ([place], [place], [place])  Edel Aguirre CNP ([facility])        OUR SLEEP TESTING LOCATIONS     Our team will contact you to schedule your sleep study, however, you can contact us as follow:  Main Phone Line (scheduling only): [phone]), option 3  Adult and Pediatric Locations  [facility] (6 years and older): [facility] - 4th floor ([address] [place] [address]) After hours line: [phone]  [facility] (Main campus: All ages): [facility], 6th floor. After hours line: [phone]  [facility] (5 years and older;  "younger considered on case-by-case basis): [address]; [address], [address]. Scheduling  After hours line: [phone]   [place] (6 years and older): [address]; [address]; [address]   [place] (6 years and older): [address] [facility], [address]  After hours line: [phone]   [place] (13 years and older) in [place]: [address], 2nd floor  After hours line: [phone]   [place] (13 year and older): [address]  After hours line: [phone]     Adult Only Locations:   [place] (18 years and older): [address] [place] [address], 2nd floor   [place] (18 years and older): [address] [facility]; 4th floor  After hours line: [phone]   [place] (18 years and older) at [place]: [address] [place] [address]  After hours line: [phone]          CONTACTING YOUR SLEEP MEDICINE PROVIDER     Send a message directly to your provider through \"My Chart\", which is the email service through your  Records Account: [email or web address]   Call [phone] and leave a message. One of the administrative assistants will forward the message to your sleep medicine provider through \"My Chart\" and/or email.     Your sleep medicine provider for this visit was: Trisha Forrest PA-C    "

## 2024-05-06 NOTE — PROGRESS NOTES
Patient: Adriana Gutierrez    [id number]  : 1985 -- AGE 39 y.o.    Provider: Trisha Forrest PA-C     Location [facility]   Service Date: 2024              [facility] Sleep Medicine Clinic  New Visit Note      HISTORY OF PRESENT ILLNESS     The patient's referring provider is: Fanta Sam MD; Madisyn Evangelista DO    HISTORY OF PRESENT ILLNESS   Adriana Gutierrez is a 39 y.o. female with h/o allergic rhinitis, binge eating disorder (mild), LORRAINE, ADHD,obesity who presents to a [facility] Sleep Medicine Clinic for a sleep medicine evaluation with concerns of New Patient Visit (New patient visit following an at home sleep study 2024).     Past Sleep History  HSAT 2024 --  AHI 3% ~12  AHI4%: ~6  O2 lazara: 77%        Current History    On today's visit, the patient reports referred by allergy/immunology for a witnessed apnea when sleeping, states a boyfriend had noted this and woke her up.  States one other time she woke herself up once in the past few months where she felt like she was gasping. States she is told by his that she snores and grinds her teeth. Had a mouthguard, cannot find it - will talk to dentist about a new one.  In past had sinus plasty in [place] where she used to live. Had HSAT last month indicating mild sleep apnea and is here for follow up.   Denies any symptoms of parasomnia including sleep paralysis, sleep walking, dream enactment.     Struggles with nasal congestion/PND and has seen ENT in past, not currently seeing anyone but would like to. Prescribed Flonase.     Sleep schedule:  *hybrid as a    In bed: MN  Subjective sleep latency:  within 20 minutes   Awakenings during night:  none   Final awakening time:  6/6:30AM  Naps: twice a week on average afterwork for 1 -1.5 hours (4 or 5P)    Overall estimate of total sleep time: 6  Weekends/Days off: sleeps MN - 7-8AM    Preferred sleeping position: SLEEP POSITION: supine,  prone, and sidelying        ESS:  12  SUSAN:  17  FOSQ: 27      REVIEW OF SYSTEMS     REVIEW OF SYSTEMS  See HPI; all other ROS were reviewed and negative for compliant        ALLERGIES AND MEDICATIONS     ALLERGIES  No Known Allergies    MEDICATIONS  Current Outpatient Medications   Medication Sig Dispense Refill    fluticasone (Flonase) 50 mcg/actuation nasal spray Administer 2 sprays into each nostril once daily. Shake gently. Before first use, prime pump. After use, clean tip and replace cap. 16 g 1    hydrOXYzine HCL (Atarax) 25 mg tablet Take 1 tablet (25 mg) by mouth once daily at bedtime.      levonorgestrel (Mirena) 21 mcg/24 hours (8 yrs) 52 mg IUD by intrauterine route.      lisdexamfetamine (Vyvanse) 40 mg capsule Take 1 capsule (40 mg) by mouth once daily in the morning.      omeprazole (PriLOSEC) 20 mg DR capsule TAKE 1 WHOLE CAPSULE (20 MG) BY MOUTH DAILY IN THE MORNING-BEFORE A MEAL 90 capsule 4    sertraline 200 mg capsule Take 200 mg by mouth once daily.       No current facility-administered medications for this visit.         PAST HISTORY     PAST MEDICAL HISTORY  She  has a past medical history of Acne, ADHD (attention deficit hyperactivity disorder), Allergic, Anxiety, Depression, Encounter for screening for infections with a predominantly sexual mode of transmission (02/15/2018), GERD (gastroesophageal reflux disease), Headache, Heart murmur, Melanoma (Multi) (10/2019), Other specified health status (02/15/2018), Personal history of other diseases of the female genital tract (02/15/2018), Personal history of other infectious and parasitic diseases (12/15/2014), PONV (postoperative nausea and vomiting), and Urticaria.    PAST SURGICAL HISTORY:  Past Surgical History:   Procedure Laterality Date    BREAST SURGERY  March 19, 2021    DILATION AND CURETTAGE OF UTERUS  12/15/2014    Dilation And Curettage    MOUTH SURGERY  12/15/2014    Oral Surgery Tooth Extraction    OTHER SURGICAL HISTORY   09/03/2021    Breast reduction    OTHER SURGICAL HISTORY  09/03/2021    Excision melanoma    SKIN BIOPSY      WISDOM TOOTH EXTRACTION  2009       FAMILY HISTORY  Family History   Problem Relation Name Age of Onset    Eczema Sister      Allergies Other      Diabetes Mother Sakina     Arthritis Mother Sakina     Hypertension Mother Sakina     Arthritis Maternal Grandmother Madelena     Stroke Paternal Grandmother Hamida     Cancer Father's Sister Rafia     Rashes / Skin problems Sister Shanice        She does not have a family history of sleep disorder.    SOCIAL HISTORY  She  reports that she quit smoking about 3 years ago. Her smoking use included cigarettes. She has never used smokeless tobacco. She reports that she does not currently use alcohol. She reports that she does not use drugs. She      PHYSICAL EXAM     VITAL SIGNS: There were no vitals taken for this visit.     CURRENT WEIGHT: There were no vitals filed for this visit.  There is no height or weight on file to calculate BMI.  PREVIOUS WEIGHTS:  Wt Readings from Last 3 Encounters:   04/24/24 83.9 kg (185 lb)   03/19/24 83.9 kg (185 lb)   01/24/24 89.3 kg (196 lb 12.8 oz)       Constitutional: Alert and oriented, cooperative, no acute distress  Head: Normocephalic, atraumatic   Cranial Features: No abnormal craniofacial features  Neck: Supple. Trachea midline.  Pulmonary: Non-labored breathing, speaks in full sentences.   Cardiac: regular rate   Extremities: No clubbing, no edema  Neuromuscular: Cranial nerves grossly intact, no focal deficits      RESULTS/DATA     Bicarbonate (mmol/L)   Date Value   11/03/2023 30   10/17/2019 28     Iron (ug/dL)   Date Value   11/03/2023 137     % Saturation (%)   Date Value   11/03/2023 42     TIBC (ug/dL)   Date Value   11/03/2023 324             ASSESSMENT/PLAN     Ms. Gutierrez is a 39 y.o. female and She was referred to the [facility] Medicine [facility] for evaluation of FERCHO    Problem List, Orders, Assessment,  Recommendations:  Problem List Items Addressed This Visit             ICD-10-CM    Allergic rhinitis - Primary J30.9    Relevant Orders    Referral to ENT    Obstructive sleep apnea G47.33     -Mild  -avoid supine sleep  -avoid drowsy driving  -exercise/healthy diet  -will see ENT, work on increasing night time sleep  -discussed options for CPAP and OAT, will let me know if she would like to try these but will otherwise hold off for now         Relevant Orders    Referral to ENT    Obesity (BMI 35.0-39.9 without comorbidity) E66.9     -discuss healthy diet/weight loss  -reports she exercises regularly                Disposition    Return to clinic in 3-4 months

## 2024-06-21 ENCOUNTER — PATIENT MESSAGE (OUTPATIENT)
Dept: ALLERGY | Facility: CLINIC | Age: 39
End: 2024-06-21
Payer: COMMERCIAL

## 2024-06-21 DIAGNOSIS — J30.9 ALLERGIC RHINITIS, UNSPECIFIED SEASONALITY, UNSPECIFIED TRIGGER: Primary | ICD-10-CM

## 2024-06-24 RX ORDER — HYDROXYZINE HYDROCHLORIDE 25 MG/1
25 TABLET, FILM COATED ORAL EVERY 6 HOURS PRN
Qty: 90 TABLET | Refills: 0 | Status: SHIPPED | OUTPATIENT
Start: 2024-06-24 | End: 2025-06-24

## 2024-07-30 PROBLEM — D23.9 DYSPLASTIC NEVUS: Status: ACTIVE | Noted: 2020-03-03

## 2024-09-10 ENCOUNTER — APPOINTMENT (OUTPATIENT)
Dept: SLEEP MEDICINE | Facility: CLINIC | Age: 39
End: 2024-09-10
Payer: COMMERCIAL

## 2024-10-23 ENCOUNTER — APPOINTMENT (OUTPATIENT)
Dept: DERMATOLOGY | Facility: CLINIC | Age: 39
End: 2024-10-23
Payer: COMMERCIAL

## 2024-10-26 DIAGNOSIS — J30.9 ALLERGIC RHINITIS, UNSPECIFIED SEASONALITY, UNSPECIFIED TRIGGER: ICD-10-CM

## 2024-10-28 DIAGNOSIS — J30.9 ALLERGIC RHINITIS, UNSPECIFIED SEASONALITY, UNSPECIFIED TRIGGER: Primary | ICD-10-CM

## 2024-10-28 RX ORDER — HYDROXYZINE HYDROCHLORIDE 25 MG/1
25 TABLET, FILM COATED ORAL EVERY 6 HOURS PRN
Qty: 90 TABLET | Refills: 0 | Status: SHIPPED | OUTPATIENT
Start: 2024-10-28 | End: 2025-10-28

## 2024-11-01 ENCOUNTER — APPOINTMENT (OUTPATIENT)
Dept: OBSTETRICS AND GYNECOLOGY | Facility: HOSPITAL | Age: 39
End: 2024-11-01
Payer: COMMERCIAL

## 2024-11-20 DIAGNOSIS — J30.9 ALLERGIC RHINITIS, UNSPECIFIED SEASONALITY, UNSPECIFIED TRIGGER: ICD-10-CM

## 2024-11-20 RX ORDER — HYDROXYZINE HYDROCHLORIDE 25 MG/1
25 TABLET, FILM COATED ORAL EVERY 6 HOURS PRN
Qty: 90 TABLET | Refills: 0 | Status: SHIPPED | OUTPATIENT
Start: 2024-11-20 | End: 2025-11-20

## 2024-12-02 ENCOUNTER — PATIENT MESSAGE (OUTPATIENT)
Facility: CLINIC | Age: 39
End: 2024-12-02
Payer: COMMERCIAL

## 2024-12-03 ENCOUNTER — APPOINTMENT (OUTPATIENT)
Dept: SLEEP MEDICINE | Facility: CLINIC | Age: 39
End: 2024-12-03
Payer: COMMERCIAL

## 2024-12-06 ENCOUNTER — OFFICE VISIT (OUTPATIENT)
Facility: CLINIC | Age: 39
End: 2024-12-06
Payer: COMMERCIAL

## 2024-12-06 ENCOUNTER — LAB (OUTPATIENT)
Dept: LAB | Facility: LAB | Age: 39
End: 2024-12-06
Payer: COMMERCIAL

## 2024-12-06 VITALS
HEART RATE: 76 BPM | HEIGHT: 61 IN | OXYGEN SATURATION: 97 % | DIASTOLIC BLOOD PRESSURE: 84 MMHG | SYSTOLIC BLOOD PRESSURE: 136 MMHG | TEMPERATURE: 98.1 F | WEIGHT: 189 LBS | RESPIRATION RATE: 18 BRPM | BODY MASS INDEX: 35.68 KG/M2

## 2024-12-06 DIAGNOSIS — Z79.899 LONG-TERM CURRENT USE OF PROTON PUMP INHIBITOR THERAPY: ICD-10-CM

## 2024-12-06 DIAGNOSIS — E87.5 HYPERKALEMIA: Primary | ICD-10-CM

## 2024-12-06 DIAGNOSIS — E87.5 HYPERKALEMIA: ICD-10-CM

## 2024-12-06 PROCEDURE — 83735 ASSAY OF MAGNESIUM: CPT

## 2024-12-06 PROCEDURE — 1036F TOBACCO NON-USER: CPT | Performed by: FAMILY MEDICINE

## 2024-12-06 PROCEDURE — 99213 OFFICE O/P EST LOW 20 MIN: CPT | Performed by: FAMILY MEDICINE

## 2024-12-06 PROCEDURE — 36415 COLL VENOUS BLD VENIPUNCTURE: CPT

## 2024-12-06 PROCEDURE — 80048 BASIC METABOLIC PNL TOTAL CA: CPT

## 2024-12-06 PROCEDURE — 3008F BODY MASS INDEX DOCD: CPT | Performed by: FAMILY MEDICINE

## 2024-12-06 NOTE — PATIENT INSTRUCTIONS
Today we have addressed your history of high potassium.  I have ordered a potassium/kidney function to be rechecked.      I recommend diet and exercise instead of receiving semiglutide from a med spa so please let me know if you would like to discuss other forms of healthy diet/exercise planning.

## 2024-12-06 NOTE — PROGRESS NOTES
"Current Outpatient Medications   Medication Sig Dispense Refill    hydrOXYzine HCL (Atarax) 25 mg tablet TAKE 1 TABLET (25 MG) BY MOUTH EVERY 6 HOURS IF NEEDED FOR ITCHING. 90 tablet 0    levonorgestrel (Mirena) 21 mcg/24 hours (8 yrs) 52 mg IUD by intrauterine route.      omeprazole (PriLOSEC) 20 mg DR capsule TAKE 1 WHOLE CAPSULE (20 MG) BY MOUTH DAILY IN THE MORNING-BEFORE A MEAL 90 capsule 4    sertraline 200 mg capsule Take 200 mg by mouth once daily.      fluticasone (Flonase) 50 mcg/actuation nasal spray Administer 2 sprays into each nostril once daily. Shake gently. Before first use, prime pump. After use, clean tip and replace cap. 16 g 1     No current facility-administered medications for this visit.   Subjective   Patient ID: Adriana Gutierrez is a 39 y.o. female who presents for Potassium Level.    She had bloodwork done to get semiglutide.  This was at a [facility].  She had an elevated potassium so they told her she needed to have more labs done.  She denies any heart palpitations and denies muscle cramping.  She has never had high potassium or kidney issues in the past.           Review of Systems    Objective   /84 (BP Location: Right arm, Patient Position: Sitting)   Pulse 76   Temp 36.7 °C (98.1 °F) (Temporal)   Resp 18   Ht 1.549 m (5' 1\")   Wt 85.7 kg (189 lb)   SpO2 97%   BMI 35.71 kg/m²     Physical Exam  Constitutional:       Appearance: Normal appearance.   HENT:      Head: Normocephalic.   Eyes:      Pupils: Pupils are equal, round, and reactive to light.   Cardiovascular:      Rate and Rhythm: Normal rate and regular rhythm.   Pulmonary:      Effort: Pulmonary effort is normal.      Breath sounds: Normal breath sounds.   Musculoskeletal:      Cervical back: Normal range of motion.   Skin:     General: Skin is warm.   Neurological:      General: No focal deficit present.      Mental Status: She is alert and oriented to person, place, and time.   Psychiatric:         Mood and Affect: " Mood normal.       Assessment/Plan   Diagnoses and all orders for this visit:  Hyperkalemia  -     Basic metabolic panel; Future

## 2024-12-07 LAB
ANION GAP SERPL CALC-SCNC: 13 MMOL/L (ref 10–20)
BUN SERPL-MCNC: 11 MG/DL (ref 6–23)
CALCIUM SERPL-MCNC: 9.2 MG/DL (ref 8.6–10.6)
CHLORIDE SERPL-SCNC: 104 MMOL/L (ref 98–107)
CO2 SERPL-SCNC: 28 MMOL/L (ref 21–32)
CREAT SERPL-MCNC: 0.76 MG/DL (ref 0.5–1.05)
EGFRCR SERPLBLD CKD-EPI 2021: >90 ML/MIN/1.73M*2
GLUCOSE SERPL-MCNC: 99 MG/DL (ref 74–99)
MAGNESIUM SERPL-MCNC: 1.97 MG/DL (ref 1.6–2.4)
POTASSIUM SERPL-SCNC: 4.5 MMOL/L (ref 3.5–5.3)
SODIUM SERPL-SCNC: 140 MMOL/L (ref 136–145)

## 2025-01-10 ENCOUNTER — APPOINTMENT (OUTPATIENT)
Dept: PRIMARY CARE | Facility: CLINIC | Age: 40
End: 2025-01-10
Payer: COMMERCIAL

## 2025-01-29 ENCOUNTER — APPOINTMENT (OUTPATIENT)
Dept: GASTROENTEROLOGY | Facility: CLINIC | Age: 40
End: 2025-01-29
Payer: COMMERCIAL

## 2025-03-03 NOTE — PATIENT INSTRUCTIONS
Continue over the counter Flonase 2 sprays each side one time a day.     Have nasal valve evaluation by Dr. Doty or Dr. Frankel    Continue Hydroxyzine

## 2025-03-03 NOTE — PROGRESS NOTES
Subjective   Patient ID:   [id number]   Adriana Gutierrez is a 40 y.o. female who presents for Allergic Rhinitis (Annual follow up).    Chief Complaint   Patient presents with    Allergic Rhinitis     Annual follow up      Patient presents for F/U of allergic rhinitis.    Since last visit, 3-19-24, patient states she is doing well.    She has a follow up appt in May regarding her at home sleep study (JULIA was 77).  She is in denial that she has sleep apnea. She has sleep apnea and was recommended to use the machine but patient is not ready to do that yet because it freaks her out but will discuss it again in May. She is trying to track her sleep on her watch.     She has been doing hot yoga for 2 years and still gets light headed and feels like she cannot catch her breath.  She feels like she is not getting air in.    ENT said one side was deviated, there is a bone spur, and one side was swollen.   Had a balloon in her nose when she moved to [place] but it has not helped.    Uses flonase  Does not take allegra  Needs refill of hydroxyzine for her itching skin.    Have nasal valves evaluated by Dr. Doty or Dr. Frankel (ENT).  Hold off on CPAP until you can see ENT.  Continue flonase.        Objective   Wt 81.6 kg (180 lb)   BMI 34.01 kg/m²      Physical Exam  Vitals reviewed.   Constitutional:       Appearance: Normal appearance.   HENT:      Head: Normocephalic and atraumatic.      Right Ear: Hearing, ear canal and external ear normal. No tenderness. No middle ear effusion. There is no impacted cerumen. Tympanic membrane is not erythematous, retracted or bulging.      Left Ear: Hearing, tympanic membrane, ear canal and external ear normal. No tenderness.  No middle ear effusion. There is no impacted cerumen. Tympanic membrane is not erythematous, retracted or bulging.      Ears:      Comments: Right TM is dull     Nose: Nose normal. No congestion or rhinorrhea.      Right Turbinates: Swollen.      Left  Turbinates: Not swollen.      Comments: Nasal valve collapse  Eyes:      Extraocular Movements: Extraocular movements intact.      Conjunctiva/sclera: Conjunctivae normal.      Pupils: Pupils are equal, round, and reactive to light.   Cardiovascular:      Rate and Rhythm: Normal rate and regular rhythm.      Heart sounds: No murmur heard.     No friction rub. No gallop.   Pulmonary:      Effort: No respiratory distress.      Breath sounds: No wheezing, rhonchi or rales.   Skin:     General: Skin is warm and dry.   Neurological:      Mental Status: She is alert.   Psychiatric:         Mood and Affect: Mood normal.         Behavior: Behavior normal.       Assessment/Plan     Allergic rhinitis  She is constantly congested. I think she may have nasal value collapse. I would like her to see ENT to be evaluated.     By signing my name below, I, Yogi Uribeibadriano, attest that this documentation has been prepared under the direction and in the presence of Fanta Sam MD.  All medical record entries made by the Scribe were at my direction and personally dictated by me. I have reviewed the chart and agree that the record accurately reflects my personal performance of the history, physical exam, discussion and plan.

## 2025-03-05 ENCOUNTER — OFFICE VISIT (OUTPATIENT)
Facility: CLINIC | Age: 40
End: 2025-03-05
Payer: COMMERCIAL

## 2025-03-05 ENCOUNTER — APPOINTMENT (OUTPATIENT)
Dept: ALLERGY | Facility: CLINIC | Age: 40
End: 2025-03-05
Payer: COMMERCIAL

## 2025-03-05 ENCOUNTER — HOSPITAL ENCOUNTER (OUTPATIENT)
Dept: RADIOLOGY | Facility: CLINIC | Age: 40
Discharge: HOME | End: 2025-03-05
Payer: COMMERCIAL

## 2025-03-05 VITALS
TEMPERATURE: 97.6 F | HEIGHT: 61 IN | BODY MASS INDEX: 34.04 KG/M2 | RESPIRATION RATE: 18 BRPM | SYSTOLIC BLOOD PRESSURE: 126 MMHG | OXYGEN SATURATION: 100 % | WEIGHT: 180.3 LBS | DIASTOLIC BLOOD PRESSURE: 80 MMHG | HEART RATE: 80 BPM

## 2025-03-05 VITALS — WEIGHT: 180 LBS | BODY MASS INDEX: 34.01 KG/M2

## 2025-03-05 DIAGNOSIS — M25.569 KNEE PAIN, UNSPECIFIED CHRONICITY, UNSPECIFIED LATERALITY: ICD-10-CM

## 2025-03-05 DIAGNOSIS — Z00.00 HEALTHCARE MAINTENANCE: Primary | ICD-10-CM

## 2025-03-05 DIAGNOSIS — J30.9 ALLERGIC RHINITIS, UNSPECIFIED SEASONALITY, UNSPECIFIED TRIGGER: Primary | ICD-10-CM

## 2025-03-05 DIAGNOSIS — M25.562 PAIN IN LEFT KNEE: ICD-10-CM

## 2025-03-05 PROBLEM — K21.9 GASTROESOPHAGEAL REFLUX DISEASE WITHOUT ESOPHAGITIS: Status: ACTIVE | Noted: 2025-03-05

## 2025-03-05 LAB
POC APPEARANCE, URINE: CLEAR
POC BILIRUBIN, URINE: NEGATIVE
POC BLOOD, URINE: NEGATIVE
POC COLOR, URINE: YELLOW
POC GLUCOSE, URINE: NEGATIVE MG/DL
POC KETONES, URINE: NEGATIVE MG/DL
POC LEUKOCYTES, URINE: NEGATIVE
POC NITRITE,URINE: NEGATIVE
POC PH, URINE: 6 PH
POC PROTEIN, URINE: NEGATIVE MG/DL
POC SPECIFIC GRAVITY, URINE: 1.02
POC UROBILINOGEN, URINE: 0.2 EU/DL

## 2025-03-05 PROCEDURE — 73562 X-RAY EXAM OF KNEE 3: CPT | Mod: LT

## 2025-03-05 PROCEDURE — 99214 OFFICE O/P EST MOD 30 MIN: CPT | Performed by: ALLERGY & IMMUNOLOGY

## 2025-03-05 PROCEDURE — 99395 PREV VISIT EST AGE 18-39: CPT | Performed by: FAMILY MEDICINE

## 2025-03-05 PROCEDURE — 81002 URINALYSIS NONAUTO W/O SCOPE: CPT | Performed by: FAMILY MEDICINE

## 2025-03-05 PROCEDURE — 90656 IIV3 VACC NO PRSV 0.5 ML IM: CPT | Performed by: FAMILY MEDICINE

## 2025-03-05 PROCEDURE — 3008F BODY MASS INDEX DOCD: CPT | Performed by: FAMILY MEDICINE

## 2025-03-05 PROCEDURE — 99213 OFFICE O/P EST LOW 20 MIN: CPT | Performed by: FAMILY MEDICINE

## 2025-03-05 PROCEDURE — 1036F TOBACCO NON-USER: CPT | Performed by: FAMILY MEDICINE

## 2025-03-05 PROCEDURE — 90471 IMMUNIZATION ADMIN: CPT | Performed by: FAMILY MEDICINE

## 2025-03-05 PROCEDURE — 73562 X-RAY EXAM OF KNEE 3: CPT | Mod: RT

## 2025-03-05 RX ORDER — HYDROXYZINE HYDROCHLORIDE 25 MG/1
25 TABLET, FILM COATED ORAL EVERY 6 HOURS PRN
Qty: 90 TABLET | Refills: 3 | Status: SHIPPED | OUTPATIENT
Start: 2025-03-05 | End: 2026-03-05

## 2025-03-05 RX ORDER — FLUVOXAMINE MALEATE 150 MG/1
150 CAPSULE, EXTENDED RELEASE ORAL DAILY
COMMUNITY
Start: 2025-02-07

## 2025-03-05 RX ORDER — PROPRANOLOL HYDROCHLORIDE 10 MG/1
10 TABLET ORAL
COMMUNITY
Start: 2024-12-11

## 2025-03-05 ASSESSMENT — PATIENT HEALTH QUESTIONNAIRE - PHQ9
2. FEELING DOWN, DEPRESSED OR HOPELESS: NOT AT ALL
SUM OF ALL RESPONSES TO PHQ9 QUESTIONS 1 AND 2: 0
1. LITTLE INTEREST OR PLEASURE IN DOING THINGS: NOT AT ALL

## 2025-03-05 NOTE — PROGRESS NOTES
Subjective   Patient ID: Adriana Gutierrez is a 40 y.o. female who presents for Annual Exam.    Dentist and Eye Doctor appointments: due for dentist; never saw eye doctor  Immunizations: due for flu shot  Diet: eats healthy overall  Exercise: Hot yoga  Supplements: MVI, Magnesium, calcium  Menstrual Cycles: IUD  Sexually Active: yes, no std concerns  Pregnancy History:  Cancer Screening:    Cervical: follow up in 2 weeks for this 2018 - normal, negative hpv   Breast: n/a   Colon: n/a   Skin: sees derm regularly   DEXA: n/a       Has crackling sounds like velcro when she goes up and down stairs  She feels like she has had knee pain for her whole adult life  Concerne because mom has lots of issues with arthritis        Review of Systems      Current Outpatient Medications:     fluvoxaMINE 150 mg capsule,extended release 24hr, Take 150 mg by mouth once daily., Disp: , Rfl:     hydrOXYzine HCL (Atarax) 25 mg tablet, TAKE 1 TABLET (25 MG) BY MOUTH EVERY 6 HOURS IF NEEDED FOR ITCHING., Disp: 90 tablet, Rfl: 0    levonorgestrel (Mirena) 21 mcg/24 hours (8 yrs) 52 mg IUD, by intrauterine route., Disp: , Rfl:     omeprazole (PriLOSEC) 20 mg DR capsule, TAKE 1 WHOLE CAPSULE (20 MG) BY MOUTH DAILY IN THE MORNING-BEFORE A MEAL, Disp: 90 capsule, Rfl: 4    fluticasone (Flonase) 50 mcg/actuation nasal spray, Administer 2 sprays into each nostril once daily. Shake gently. Before first use, prime pump. After use, clean tip and replace cap., Disp: 16 g, Rfl: 1    No Known Allergies    Past Medical History:   Diagnosis Date    Acne     ADHD (attention deficit hyperactivity disorder)     Allergic     Anxiety     Depression     Dysplastic nevus 2020    Dysplastic nevus 10/31/2019    Dysplastic nevus 10/17/2019    Encounter for screening for infections with a predominantly sexual mode of transmission 02/15/2018    Routine screening for STI (sexually transmitted infection)    GERD (gastroesophageal reflux disease)     Headache   "   Heart murmur     Melanoma (Multi) 10/2019    Other specified health status 02/15/2018    Contraception    Personal history of other diseases of the female genital tract 02/15/2018    History of pelvic inflammatory disease    Personal history of other infectious and parasitic diseases 12/15/2014    History of condyloma acuminatum    PONV (postoperative nausea and vomiting)     Urticaria        Social History     Tobacco Use    Smoking status: Former     Types: Cigarettes    Smokeless tobacco: Never    Tobacco comments:     Smoked for 8 or 9 years in her 20's, less than a ppd   Vaping Use    Vaping status: Never Used   Substance Use Topics    Alcohol use: Not Currently     Comment: maybe once a month    Drug use: Never       Objective     Vitals:    03/05/25 1257   BP: 126/80   Pulse: 80   Resp: 18   Temp: 36.4 °C (97.6 °F)   SpO2: 100%   Weight: 81.8 kg (180 lb 4.8 oz)   Height: 1.549 m (5' 1\")     Patient's last menstrual period was 03/03/2025.    Physical Exam  Constitutional:       General: She is not in acute distress.     Appearance: She is well-developed. She is not diaphoretic.   HENT:      Head: Normocephalic and atraumatic.      Right Ear: Tympanic membrane normal.      Left Ear: Tympanic membrane normal.      Nose: Nose normal.      Mouth/Throat:      Mouth: Mucous membranes are moist.   Eyes:      General: No scleral icterus.     Pupils: Pupils are equal, round, and reactive to light.   Neck:      Thyroid: No thyromegaly.      Vascular: No JVD.   Cardiovascular:      Rate and Rhythm: Normal rate and regular rhythm.      Heart sounds: Normal heart sounds. No murmur heard.     No friction rub. No gallop.   Pulmonary:      Effort: Pulmonary effort is normal. No respiratory distress.      Breath sounds: Normal breath sounds. No wheezing or rales.   Chest:      Chest wall: No tenderness.   Breasts:     Right: Normal.      Left: Normal.   Abdominal:      General: Bowel sounds are normal. There is no " distension.      Palpations: Abdomen is soft. There is no mass.      Tenderness: There is no abdominal tenderness. There is no rebound.   Musculoskeletal:         General: Normal range of motion.      Cervical back: Normal range of motion and neck supple.   Lymphadenopathy:      Cervical: No cervical adenopathy.   Skin:     General: Skin is warm and dry.   Neurological:      General: No focal deficit present.      Mental Status: She is alert and oriented to person, place, and time.      Deep Tendon Reflexes: Reflexes normal.   Psychiatric:         Mood and Affect: Mood normal.         Behavior: Behavior normal.         Assessment/Plan   Diagnoses and all orders for this visit:  Healthcare maintenance  -     POCT UA (nonautomated) manually resulted  -     CBC; Future  -     Comprehensive Metabolic Panel; Future  -     Lipid Panel; Future  -     TSH with reflex to Free T4 if abnormal; Future  -     Vitamin D 25 hydroxy; Future  Knee pain, unspecified chronicity, unspecified laterality  -     XR knee right 3 views; Future  Other orders  -     Flu vaccine, trivalent, preservative free, age 6 months and greater (Fluarix/Fluzone/Flulaval)

## 2025-03-05 NOTE — PATIENT INSTRUCTIONS
Today we performed your Annual Physical Exam.  Your preventative health care, labs and vaccinations have been reviewed and are up to date.      Your vaccines are up to date.  You were given a seasonal Flu vaccine today      For your knee pain and crepitus I have ordered Xrays and may recommend PT depending on what we find    Follow up in 2 weeks for your pap only    Follow up in 1 year for your Complete Physical Exam

## 2025-03-06 ENCOUNTER — TELEPHONE (OUTPATIENT)
Facility: CLINIC | Age: 40
End: 2025-03-06
Payer: COMMERCIAL

## 2025-03-06 DIAGNOSIS — E55.9 HYPOVITAMINOSIS D: Primary | ICD-10-CM

## 2025-03-06 LAB
25(OH)D3+25(OH)D2 SERPL-MCNC: 26 NG/ML (ref 30–100)
ALBUMIN SERPL-MCNC: 4.4 G/DL (ref 3.6–5.1)
ALP SERPL-CCNC: 43 U/L (ref 31–125)
ALT SERPL-CCNC: 9 U/L (ref 6–29)
ANION GAP SERPL CALCULATED.4IONS-SCNC: 6 MMOL/L (CALC) (ref 7–17)
AST SERPL-CCNC: 11 U/L (ref 10–30)
BILIRUB SERPL-MCNC: 0.5 MG/DL (ref 0.2–1.2)
BUN SERPL-MCNC: 14 MG/DL (ref 7–25)
CALCIUM SERPL-MCNC: 9 MG/DL (ref 8.6–10.2)
CHLORIDE SERPL-SCNC: 105 MMOL/L (ref 98–110)
CHOLEST SERPL-MCNC: 174 MG/DL
CHOLEST/HDLC SERPL: 3 (CALC)
CO2 SERPL-SCNC: 28 MMOL/L (ref 20–32)
CREAT SERPL-MCNC: 0.85 MG/DL (ref 0.5–0.97)
EGFRCR SERPLBLD CKD-EPI 2021: 90 ML/MIN/1.73M2
ERYTHROCYTE [DISTWIDTH] IN BLOOD BY AUTOMATED COUNT: 12.3 % (ref 11–15)
GLUCOSE SERPL-MCNC: 86 MG/DL (ref 65–99)
HCT VFR BLD AUTO: 41 % (ref 35–45)
HDLC SERPL-MCNC: 58 MG/DL
HGB BLD-MCNC: 13.6 G/DL (ref 11.7–15.5)
LDLC SERPL CALC-MCNC: 104 MG/DL (CALC)
MCH RBC QN AUTO: 30.4 PG (ref 27–33)
MCHC RBC AUTO-ENTMCNC: 33.2 G/DL (ref 32–36)
MCV RBC AUTO: 91.5 FL (ref 80–100)
NONHDLC SERPL-MCNC: 116 MG/DL (CALC)
PLATELET # BLD AUTO: 273 THOUSAND/UL (ref 140–400)
PMV BLD REES-ECKER: 11.7 FL (ref 7.5–12.5)
POTASSIUM SERPL-SCNC: 4.6 MMOL/L (ref 3.5–5.3)
PROT SERPL-MCNC: 6.5 G/DL (ref 6.1–8.1)
RBC # BLD AUTO: 4.48 MILLION/UL (ref 3.8–5.1)
SODIUM SERPL-SCNC: 139 MMOL/L (ref 135–146)
TRIGL SERPL-MCNC: 42 MG/DL
TSH SERPL-ACNC: 2.27 MIU/L
WBC # BLD AUTO: 8.1 THOUSAND/UL (ref 3.8–10.8)

## 2025-03-06 RX ORDER — ERGOCALCIFEROL 1.25 MG/1
50000 CAPSULE ORAL WEEKLY
Qty: 12 CAPSULE | Refills: 0 | Status: SHIPPED | OUTPATIENT
Start: 2025-03-06 | End: 2025-05-29

## 2025-03-10 NOTE — ASSESSMENT & PLAN NOTE
She is constantly congested. I think she may have nasal value collapse. I would like her to see ENT to be evaluated.

## 2025-03-19 ENCOUNTER — APPOINTMENT (OUTPATIENT)
Dept: OTOLARYNGOLOGY | Facility: CLINIC | Age: 40
End: 2025-03-19
Payer: COMMERCIAL

## 2025-03-20 ENCOUNTER — APPOINTMENT (OUTPATIENT)
Facility: CLINIC | Age: 40
End: 2025-03-20
Payer: COMMERCIAL

## 2025-03-31 ENCOUNTER — APPOINTMENT (OUTPATIENT)
Dept: OTOLARYNGOLOGY | Facility: CLINIC | Age: 40
End: 2025-03-31
Payer: COMMERCIAL

## 2025-03-31 ENCOUNTER — PREP FOR PROCEDURE (OUTPATIENT)
Dept: OTOLARYNGOLOGY | Facility: CLINIC | Age: 40
End: 2025-03-31

## 2025-03-31 VITALS — HEIGHT: 61 IN | BODY MASS INDEX: 33.95 KG/M2 | WEIGHT: 179.8 LBS

## 2025-03-31 DIAGNOSIS — R22.0 SWELLING OF NOSE: ICD-10-CM

## 2025-03-31 DIAGNOSIS — J34.8210 NASAL ALAR COLLAPSE: ICD-10-CM

## 2025-03-31 DIAGNOSIS — M95.0 NASAL DEFORMITY: ICD-10-CM

## 2025-03-31 DIAGNOSIS — J34.3 HYPERTROPHY OF INFERIOR NASAL TURBINATE: ICD-10-CM

## 2025-03-31 DIAGNOSIS — J34.3 HYPERTROPHY OF NASAL TURBINATES: ICD-10-CM

## 2025-03-31 DIAGNOSIS — J34.2 DEVIATED NASAL SEPTUM: ICD-10-CM

## 2025-03-31 DIAGNOSIS — J34.2 DEVIATED SEPTUM: ICD-10-CM

## 2025-03-31 DIAGNOSIS — J34.2 DEVIATED NASAL SEPTUM: Primary | ICD-10-CM

## 2025-03-31 DIAGNOSIS — R09.81 NASAL CONGESTION: Primary | ICD-10-CM

## 2025-03-31 DIAGNOSIS — J34.89 NASAL OBSTRUCTION: ICD-10-CM

## 2025-03-31 DIAGNOSIS — R09.81 NASAL CONGESTION: ICD-10-CM

## 2025-03-31 DIAGNOSIS — M95.0 ACQUIRED DEFORMITY OF NOSE: ICD-10-CM

## 2025-03-31 DIAGNOSIS — G47.30 SLEEP-DISORDERED BREATHING: ICD-10-CM

## 2025-03-31 DIAGNOSIS — R06.89 DIFFICULTY BREATHING: ICD-10-CM

## 2025-03-31 PROCEDURE — 31231 NASAL ENDOSCOPY DX: CPT | Performed by: OTOLARYNGOLOGY

## 2025-03-31 PROCEDURE — 99214 OFFICE O/P EST MOD 30 MIN: CPT | Performed by: OTOLARYNGOLOGY

## 2025-03-31 PROCEDURE — 3008F BODY MASS INDEX DOCD: CPT | Performed by: OTOLARYNGOLOGY

## 2025-03-31 PROCEDURE — 1036F TOBACCO NON-USER: CPT | Performed by: OTOLARYNGOLOGY

## 2025-03-31 ASSESSMENT — PATIENT HEALTH QUESTIONNAIRE - PHQ9
SUM OF ALL RESPONSES TO PHQ9 QUESTIONS 1 AND 2: 0
2. FEELING DOWN, DEPRESSED OR HOPELESS: NOT AT ALL
1. LITTLE INTEREST OR PLEASURE IN DOING THINGS: NOT AT ALL

## 2025-03-31 NOTE — H&P (VIEW-ONLY)
Facial Plastic & Reconstructive Surgery    Reason for consult: Nasal obstruction    Referring provider: Dr. Sam Allergy    Chief Complaint: Obstructed breathing    Previous Otolaryngology Provider: Leila Manning Means    Previous documentation for this patient was extensively reviewed including specific reasons for evaluation. Complex nature of complaint and medical issues prompting evaluation for surgical consideration by facial plastic & reconstructive surgeon.    Adriana Gutierrez is a 40 y.o. female with PMHx of TMJ, FERCHO, BMI 34, GRD, AGUSTINA chronic sinusitis, allergic rhinitis, recurrent otitis externa, who presents in consultation for the evaluation of Bilateral nasal obstruction. Symptoms are constant, present year round, does not fluctuate. It affects the patients ability to sleep, exercise, and is troubling during the day. Symptoms began years ago; Has used daily flonase for years with minimal improvement of symptoms.     Previous CT/MRI imaging of the nasal cavity and sinuses: I personally reviewed the CT SINUS from 12/18/2020 and this is my impression: Right septal deviation with a bony spur on the right, enlarged turbinates L>R.     Previous nasal surgery: denies    Trauma history: denies    Sleep apnea or snoring history: +sleep disordered breathing    Allergic rhinitis, sinusitis history: denies    Smoking: denies    Aesthetic concern: none    Past Medical History  She has a past medical history of Acne, ADHD (attention deficit hyperactivity disorder), Allergic, Anxiety, Depression, Dysplastic nevus (03/03/2020), Dysplastic nevus (10/31/2019), Dysplastic nevus (10/17/2019), Encounter for screening for infections with a predominantly sexual mode of transmission (02/15/2018), GERD (gastroesophageal reflux disease), Headache, Heart murmur, Melanoma (Multi) (10/2019), Other specified health status (02/15/2018), Personal history of other diseases of the female genital tract (02/15/2018),  Personal history of other infectious and parasitic diseases (12/15/2014), PONV (postoperative nausea and vomiting), and Urticaria.    Surgical History  She has a past surgical history that includes Dilation and curettage of uterus (12/15/2014); Mouth surgery (12/15/2014); Skin biopsy; Breast surgery (March 19, 2021); and [place] tooth extraction (2009).     Social History  She reports that she has quit smoking. Her smoking use included cigarettes. She has never used smokeless tobacco. She reports that she does not currently use alcohol. She reports that she does not use drugs.    Family History  Family History   Problem Relation Name Age of Onset    Diabetes Mother Sakina     Arthritis Mother Sakina     Hypertension Mother Sakina     Eczema Sister      No Known Problems Brother      Cancer Father's Sister Rafia     Arthritis Maternal Grandmother Madelena     Stroke Paternal Grandmother Hamida     Allergies Other          Allergies  Patient has no known allergies.    Physical exam    General: Well-developed and well-nourished in appearance.  Skin: No rashes or concerning lesions on the visible portions of the skin.  Eyes: Extraocular movements intact. Visual fields grossly normal.  Ears: Pinna are normal in shape and position. External canals are patent.  Oral Cavity/Oropharynx: Dentition is intact. Mucous membranes moist. No masses or lesions.  Respiratory: No respiratory distress. Quiet breathing without stertor or stridor.  Cardiovascular: Regular rate and rhythm. Warm extremities with equal pulses.  Psych: Normal mood and affect. Judgement and insight appropriate.  Neuro: Alert and oriented. CN II-XII grossly intact. No focal deficits.  Musculoskeletal: Gait intact. Moves all extremities well without apparent deformities.    A comprehensive facial exam was performed with the following highlights:    Nasal skin type: moderate thickness    External exam:  None    Internal exam:   Septum: deviations present bl, right >  left  Inferior turbinates: hypertrophied bl  Nasal valve angle: reduced bilaterally    Intranasal examination performed with limited view on anterior rhinoscopy.    Procedures:    Procedure: Rigid diagnostic nasal endoscopy  Surgeon: Clarence Doty MD  Anesthesia: None  Timeout: Performed  Findings: A 0-degree rigid endoscope was passed through the patient's bilateral naris. The first pass was along the floor of the nose to the nasopharynx. The second pass was to the area of the middle meatus. The third pass was to the sphenoethmoidal recess.    Septum: Deviation is S shaped with bilateral obstruction approximately 90% without perforations nor synechia.  Internal Nasal Valve: Angle is reduced, narrowing the nasal airway bilaterally.    Right nasal cavity:  Inferior turbinate: 2+  Inferior meatus: Clear, no discharge, no polyps/masses/lesions  Middle meatus: Clear, no discharge, no polyps/masses/lesions    Left nasal cavity:  Inferior turbinate: 2+  Inferior meatus: Clear, no discharge, no polyps/masses/lesions  Middle meatus: Clear, no discharge, no polyps/masses/lesions  Nasopharynx: Clear, no discharge, no masses/lesions    Modified [place] Maneuver: Performed with a cotton tipped applicator, markedly improves breathing bilaterally.    Assessment - This patient has a significant mechanical nasal obstruction. The cause is multifactorial with septal deviation with severe internal nasal valve narrowing, turbinate hypertrophy, and static internal nasal valve collapse. There is some dynamic nasal valve collapse as well. Correction of this nasal obstruction will require a septoplasty, repair of nasal valve collapse with structural grafting, and a bilateral turbinate reduction. We discussed the medical necessity of this at length, as well as the risks and limitations of the procedures. All questions were answered.      Plan - Recommend reconstructive septoplasty, nasal valve repair with structural grafting, and inferior  turbinate reduction with lateralization.    Clarence Doty MD      , Facial Plastic & Reconstructive Surgery        P: [phone]  F: [phone]

## 2025-03-31 NOTE — PROGRESS NOTES
Facial Plastic & Reconstructive Surgery    Reason for consult: Nasal obstruction    Referring provider: Dr. Sam Allergy    Chief Complaint: Obstructed breathing    Previous Otolaryngology Provider: Leila Manning Means    Previous documentation for this patient was extensively reviewed including specific reasons for evaluation. Complex nature of complaint and medical issues prompting evaluation for surgical consideration by facial plastic & reconstructive surgeon.    Adriana Gutierrez is a 40 y.o. female with PMHx of TMJ, FERCHO, BMI 34, GRD, AGUSTINA chronic sinusitis, allergic rhinitis, recurrent otitis externa, who presents in consultation for the evaluation of Bilateral nasal obstruction. Symptoms are constant, present year round, does not fluctuate. It affects the patients ability to sleep, exercise, and is troubling during the day. Symptoms began years ago; Has used daily flonase for years with minimal improvement of symptoms.     Previous CT/MRI imaging of the nasal cavity and sinuses: I personally reviewed the CT SINUS from 12/18/2020 and this is my impression: Right septal deviation with a bony spur on the right, enlarged turbinates L>R.     Previous nasal surgery: denies    Trauma history: denies    Sleep apnea or snoring history: +sleep disordered breathing    Allergic rhinitis, sinusitis history: denies    Smoking: denies    Aesthetic concern: none    Past Medical History  She has a past medical history of Acne, ADHD (attention deficit hyperactivity disorder), Allergic, Anxiety, Depression, Dysplastic nevus (03/03/2020), Dysplastic nevus (10/31/2019), Dysplastic nevus (10/17/2019), Encounter for screening for infections with a predominantly sexual mode of transmission (02/15/2018), GERD (gastroesophageal reflux disease), Headache, Heart murmur, Melanoma (Multi) (10/2019), Other specified health status (02/15/2018), Personal history of other diseases of the female genital tract (02/15/2018),  Personal history of other infectious and parasitic diseases (12/15/2014), PONV (postoperative nausea and vomiting), and Urticaria.    Surgical History  She has a past surgical history that includes Dilation and curettage of uterus (12/15/2014); Mouth surgery (12/15/2014); Skin biopsy; Breast surgery (March 19, 2021); and [place] tooth extraction (2009).     Social History  She reports that she has quit smoking. Her smoking use included cigarettes. She has never used smokeless tobacco. She reports that she does not currently use alcohol. She reports that she does not use drugs.    Family History  Family History   Problem Relation Name Age of Onset    Diabetes Mother Sakina     Arthritis Mother Sakina     Hypertension Mother Sakina     Eczema Sister      No Known Problems Brother      Cancer Father's Sister Rafia     Arthritis Maternal Grandmother Madelena     Stroke Paternal Grandmother Hamida     Allergies Other          Allergies  Patient has no known allergies.    Physical exam    General: Well-developed and well-nourished in appearance.  Skin: No rashes or concerning lesions on the visible portions of the skin.  Eyes: Extraocular movements intact. Visual fields grossly normal.  Ears: Pinna are normal in shape and position. External canals are patent.  Oral Cavity/Oropharynx: Dentition is intact. Mucous membranes moist. No masses or lesions.  Respiratory: No respiratory distress. Quiet breathing without stertor or stridor.  Cardiovascular: Regular rate and rhythm. Warm extremities with equal pulses.  Psych: Normal mood and affect. Judgement and insight appropriate.  Neuro: Alert and oriented. CN II-XII grossly intact. No focal deficits.  Musculoskeletal: Gait intact. Moves all extremities well without apparent deformities.    A comprehensive facial exam was performed with the following highlights:    Nasal skin type: moderate thickness    External exam:  None    Internal exam:   Septum: deviations present bl, right >  left  Inferior turbinates: hypertrophied bl  Nasal valve angle: reduced bilaterally    Intranasal examination performed with limited view on anterior rhinoscopy.    Procedures:    Procedure: Rigid diagnostic nasal endoscopy  Surgeon: Clarence Doty MD  Anesthesia: None  Timeout: Performed  Findings: A 0-degree rigid endoscope was passed through the patient's bilateral naris. The first pass was along the floor of the nose to the nasopharynx. The second pass was to the area of the middle meatus. The third pass was to the sphenoethmoidal recess.    Septum: Deviation is S shaped with bilateral obstruction approximately 90% without perforations nor synechia.  Internal Nasal Valve: Angle is reduced, narrowing the nasal airway bilaterally.    Right nasal cavity:  Inferior turbinate: 2+  Inferior meatus: Clear, no discharge, no polyps/masses/lesions  Middle meatus: Clear, no discharge, no polyps/masses/lesions    Left nasal cavity:  Inferior turbinate: 2+  Inferior meatus: Clear, no discharge, no polyps/masses/lesions  Middle meatus: Clear, no discharge, no polyps/masses/lesions  Nasopharynx: Clear, no discharge, no masses/lesions    Modified [place] Maneuver: Performed with a cotton tipped applicator, markedly improves breathing bilaterally.    Assessment - This patient has a significant mechanical nasal obstruction. The cause is multifactorial with septal deviation with severe internal nasal valve narrowing, turbinate hypertrophy, and static internal nasal valve collapse. There is some dynamic nasal valve collapse as well. Correction of this nasal obstruction will require a septoplasty, repair of nasal valve collapse with structural grafting, and a bilateral turbinate reduction. We discussed the medical necessity of this at length, as well as the risks and limitations of the procedures. All questions were answered.      Plan - Recommend reconstructive septoplasty, nasal valve repair with structural grafting, and inferior  turbinate reduction with lateralization.    Clarence Doty MD      , Facial Plastic & Reconstructive Surgery        P: [phone]  F: [phone]     English

## 2025-04-01 ENCOUNTER — APPOINTMENT (OUTPATIENT)
Dept: SLEEP MEDICINE | Facility: CLINIC | Age: 40
End: 2025-04-01
Payer: COMMERCIAL

## 2025-04-02 ENCOUNTER — APPOINTMENT (OUTPATIENT)
Dept: GASTROENTEROLOGY | Facility: CLINIC | Age: 40
End: 2025-04-02
Payer: COMMERCIAL

## 2025-04-02 VITALS
HEIGHT: 61 IN | OXYGEN SATURATION: 99 % | DIASTOLIC BLOOD PRESSURE: 78 MMHG | SYSTOLIC BLOOD PRESSURE: 114 MMHG | HEART RATE: 69 BPM | BODY MASS INDEX: 33.61 KG/M2 | WEIGHT: 178 LBS

## 2025-04-02 DIAGNOSIS — F33.9 DEPRESSION, RECURRENT (CMS-HCC): ICD-10-CM

## 2025-04-02 DIAGNOSIS — K44.9 HIATAL HERNIA: ICD-10-CM

## 2025-04-02 DIAGNOSIS — K92.1 HEMATOCHEZIA: ICD-10-CM

## 2025-04-02 DIAGNOSIS — K92.0 HEMATEMESIS WITH NAUSEA: ICD-10-CM

## 2025-04-02 DIAGNOSIS — F41.1 GAD (GENERALIZED ANXIETY DISORDER): Primary | ICD-10-CM

## 2025-04-02 DIAGNOSIS — K21.9 GASTROESOPHAGEAL REFLUX DISEASE WITHOUT ESOPHAGITIS: ICD-10-CM

## 2025-04-02 DIAGNOSIS — Z79.899 LONG-TERM CURRENT USE OF PROTON PUMP INHIBITOR THERAPY: ICD-10-CM

## 2025-04-02 PROCEDURE — 3008F BODY MASS INDEX DOCD: CPT | Performed by: STUDENT IN AN ORGANIZED HEALTH CARE EDUCATION/TRAINING PROGRAM

## 2025-04-02 PROCEDURE — 99214 OFFICE O/P EST MOD 30 MIN: CPT | Performed by: STUDENT IN AN ORGANIZED HEALTH CARE EDUCATION/TRAINING PROGRAM

## 2025-04-02 PROCEDURE — 1036F TOBACCO NON-USER: CPT | Performed by: STUDENT IN AN ORGANIZED HEALTH CARE EDUCATION/TRAINING PROGRAM

## 2025-04-02 NOTE — PROGRESS NOTES
Subjective     History of Present Illness:   Adriana Gutierrez is a 40 y.o. female with hx of AGUSTINA, depression, GERD and binge eating disorder who presents to clinic for follow up of GERD and constipation.    She has been doing well since her last visit 1 year ago.  She has minimal acid reflux.  She has not had any recurrence of hematemesis.  She states she will have an episode of blood-streaked stool or when wiping after straining once every few months which is improved from previously.      With regards to her hx,   She completed upper endoscopy and colonoscopy for episode of blood streaked stool and hematemesis that was only notable for small hemorrhoids, LA Grade B esophagitis and HH. Biopsies were negative for Hpylori. She has not had any GIB since her last evaluation.    She has had complete resolution of her symptoms including GERD since starting omperazole 20 mg daily. She feels overall well and has no complaints today. She takes a Mg gummy supplement.     Review of labs show normal Ca levels.       With regards to her prior hx,  She states one month ago had worsening nausea associated with several episodes of blood-streaked emesis that resolved on its own.  The following day she was started on omeprazole and has been on this since.  She has not had recurrence of the symptoms since.  She has not had prior hematemesis in the past.  She does endorse a history of heartburn though this is sporadic and occurs every other month on average.  She has not otherwise had any weight loss.  She states her bowel movements are regular and she has 1 soft bowel movement a day.  She does endorse episodes of blood-streaked  over the past month as well, this does not fill the toilet bowel and is usually surrounding the stool or on the toilet paper.  She has no abdominal pain.  She has no family history of GI malignancy.      Past Medical History   has a past medical history of Acne, ADHD (attention deficit hyperactivity disorder),  Allergic, Anxiety, Depression, Dysplastic nevus (03/03/2020), Dysplastic nevus (10/31/2019), Dysplastic nevus (10/17/2019), Encounter for screening for infections with a predominantly sexual mode of transmission (02/15/2018), GERD (gastroesophageal reflux disease), Headache, Heart murmur, Melanoma (Multi) (10/2019), Other specified health status (02/15/2018), Personal history of other diseases of the female genital tract (02/15/2018), Personal history of other infectious and parasitic diseases (12/15/2014), PONV (postoperative nausea and vomiting), and Urticaria.     Social History   reports that she has quit smoking. Her smoking use included cigarettes. She has never been exposed to tobacco smoke. She has never used smokeless tobacco. She reports that she does not currently use alcohol. She reports that she does not use drugs.     Family History  family history includes Allergies in an other family member; Arthritis in her maternal grandmother and mother; Cancer in her father's sister; Diabetes in her mother; Eczema in her sister; Hypertension in her mother; No Known Problems in her brother; Stroke in her paternal grandmother.     Allergies  No Known Allergies      Medications  Current Outpatient Medications   Medication Instructions    ergocalciferol (VITAMIN D-2) 50,000 Units, oral, Weekly    fluticasone (Flonase) 50 mcg/actuation nasal spray 2 sprays, Each Nostril, Daily, Shake gently. Before first use, prime pump. After use, clean tip and replace cap.    fluvoxaMINE 150 mg, Daily    hydrOXYzine HCL (ATARAX) 25 mg, oral, Every 6 hours PRN    levonorgestrel (Mirena) 21 mcg/24 hours (8 yrs) 52 mg IUD by intrauterine route.    omeprazole (PriLOSEC) 20 mg DR capsule TAKE 1 WHOLE CAPSULE (20 MG) BY MOUTH DAILY IN THE MORNING-BEFORE A MEAL    propranolol (INDERAL) 10 mg        Objective   Visit Vitals  /78   Pulse 69      Physical Exam  Constitutional:       General: She is not in acute distress.     Appearance:  Normal appearance.   HENT:      Head: Normocephalic and atraumatic.      Mouth/Throat:      Mouth: Mucous membranes are moist.   Eyes:      Extraocular Movements: Extraocular movements intact.   Pulmonary:      Effort: Pulmonary effort is normal.      Breath sounds: No stridor. No wheezing.   Abdominal:      General: There is no distension.   Musculoskeletal:         General: Normal range of motion.   Skin:     General: Skin is warm and dry.   Neurological:      General: No focal deficit present.      Mental Status: She is alert.   Psychiatric:         Mood and Affect: Mood normal.         Judgment: Judgment normal.       Assessment/Plan   Adriana Gutierrez is a 40 y.o. female with hx of AGUSTINA, depression, GERD and binge eating disorder who presents to clinic for follow up of GERD and constipation.    She completed upper endoscopy and colonoscopy for these findings that was only notable for small hemorrhoids. LA grade B esophagitis, HH.  Biopsies were negative for Hpylori.     Symptoms are suggestive of rectal outlet bleeding.  She reassuringly has normal hemoglobin levels.  No alarm signs otherwise.  Discussed with patient risk factors of uncontrolled GERD including ongoing esophagitis, stricture, food impaction. Given daily heartburn and HH benefits of PPI outweigh risks. Discussed need to monitor Ca and Mg annually. May start multivitamin with Ca supplement and increase strength training to help mitigate risk of osteopenia.     Patient agreeable with plan and all questions answered. She will return to clinic in one year given resolution of her symptoms.      Problem List Items Addressed This Visit       Depression, recurrent (CMS-HCC)    AGUSTINA (generalized anxiety disorder) - Primary    Gastroesophageal reflux disease without esophagitis     Other Visit Diagnoses       Hematemesis with nausea        Hematochezia        Hiatal hernia        Long-term current use of proton pump inhibitor therapy                            Lubna Brooks MD         My final recommendations will be communicated back to the requesting physician by way of shared Medical record or letter to requesting physician via fax.

## 2025-04-14 ENCOUNTER — APPOINTMENT (OUTPATIENT)
Facility: CLINIC | Age: 40
End: 2025-04-14
Payer: COMMERCIAL

## 2025-04-14 VITALS — BODY MASS INDEX: 33.63 KG/M2 | RESPIRATION RATE: 16 BRPM | WEIGHT: 178 LBS | TEMPERATURE: 98.7 F

## 2025-04-14 DIAGNOSIS — Z12.4 CERVICAL CANCER SCREENING: Primary | ICD-10-CM

## 2025-04-14 DIAGNOSIS — N92.6 IRREGULAR MENSES: ICD-10-CM

## 2025-04-14 DIAGNOSIS — Z20.2 STD EXPOSURE: ICD-10-CM

## 2025-04-14 NOTE — PATIENT INSTRUCTIONS
Patient is scheduled for 7/8/2021. Today we have performed your pap smear    Monitor your irregular periods for the next couple of months/cycles. If continues then we will have you see GYN to discuss possibly changing out your IUD.      STD testing done today.  Follow up when results received.

## 2025-04-14 NOTE — PROGRESS NOTES
Subjective   Patient ID: Adriana Gutierrez is a 40 y.o. female who presents for Gynecologic Exam (Irregular menstrual cycles ).  She notes that her periods have been coming back and she is having some bleeding USP through the month as well. This has happened recently despite having her IUD in for 5 years and initially having no periods at all.  Recent labs were normal.  She is under a lot more stress. No plans on conceiving.      She also was just in a long term relationship and is out now and would like STD testing.      Gynecologic Exam        Review of Systems      Current Outpatient Medications:     ergocalciferol (Vitamin D-2) 1250 mcg (50,000 units) capsule, Take 1 capsule (50,000 Units) by mouth 1 (one) time per week., Disp: 12 capsule, Rfl: 0    fluticasone (Flonase) 50 mcg/actuation nasal spray, Administer 2 sprays into each nostril once daily. Shake gently. Before first use, prime pump. After use, clean tip and replace cap., Disp: 16 g, Rfl: 1    fluvoxaMINE 150 mg capsule,extended release 24hr, Take 150 mg by mouth once daily., Disp: , Rfl:     hydrOXYzine HCL (Atarax) 25 mg tablet, Take 1 tablet (25 mg) by mouth every 6 hours if needed for itching., Disp: 90 tablet, Rfl: 3    levonorgestrel (Mirena) 21 mcg/24 hours (8 yrs) 52 mg IUD, by intrauterine route., Disp: , Rfl:     omeprazole (PriLOSEC) 20 mg DR capsule, TAKE 1 WHOLE CAPSULE (20 MG) BY MOUTH DAILY IN THE MORNING-BEFORE A MEAL, Disp: 90 capsule, Rfl: 4    propranolol (Inderal) 10 mg tablet, Take 1 tablet (10 mg) by mouth., Disp: , Rfl:     No Known Allergies    Past Medical History:   Diagnosis Date    Acne     ADHD (attention deficit hyperactivity disorder)     Allergic     Anxiety     Depression     Deviated septum     Dysplastic nevus 03/03/2020    Dysplastic nevus 10/31/2019    Dysplastic nevus 10/17/2019    Encounter for screening for infections with a predominantly sexual mode of transmission 02/15/2018    Routine screening for STI  (sexually transmitted infection)    GERD (gastroesophageal reflux disease)     Headache     Heart murmur     Melanoma (Multi) 10/2019    Other specified health status 02/15/2018    Contraception    Personal history of other diseases of the female genital tract 02/15/2018    History of pelvic inflammatory disease    Personal history of other infectious and parasitic diseases 12/15/2014    History of condyloma acuminatum    PONV (postoperative nausea and vomiting)     Sleep apnea     Urticaria        Social History     Tobacco Use    Smoking status: Former     Types: Cigarettes     Passive exposure: Never    Smokeless tobacco: Never    Tobacco comments:     Smoked for 8 or 9 years in her 20's, less than a ppd   Vaping Use    Vaping status: Never Used   Substance Use Topics    Alcohol use: Not Currently     Comment: maybe once a month    Drug use: Never       Objective     Vitals:    04/14/25 1427   Resp: 16   Temp: 37.1 °C (98.7 °F)   Weight: 80.7 kg (178 lb)     Patient's last menstrual period was 04/09/2025 (approximate).    Physical Exam  Genitourinary:     General: Normal vulva.      Vagina: Normal.      Cervix: Normal.      Uterus: Normal.       Adnexa: Right adnexa normal and left adnexa normal.      Comments: IUD strings seen        Assessment/Plan   Diagnoses and all orders for this visit:  Cervical cancer screening  -     THINPREP PAP TEST  STD exposure  -     C. trachomatis / N. gonorrhoeae, Amplified, Urogenital; Future  -     Trichomonas vaginalis, Amplified; Future

## 2025-04-15 LAB
C TRACH RRNA SPEC QL NAA+PROBE: NOT DETECTED
N GONORRHOEA RRNA SPEC QL NAA+PROBE: NOT DETECTED
QUEST GC CT AMPLIFIED (ALWAYS MESSAGE): NORMAL
T VAGINALIS RRNA SPEC QL NAA+PROBE: NOT DETECTED

## 2025-04-16 DIAGNOSIS — K21.9 GASTROESOPHAGEAL REFLUX DISEASE WITHOUT ESOPHAGITIS: ICD-10-CM

## 2025-04-16 RX ORDER — OMEPRAZOLE 20 MG/1
20 CAPSULE, DELAYED RELEASE ORAL
Qty: 90 CAPSULE | Refills: 3 | Status: SHIPPED | OUTPATIENT
Start: 2025-04-16 | End: 2025-07-15

## 2025-04-18 ENCOUNTER — HOSPITAL ENCOUNTER (OUTPATIENT)
Facility: CLINIC | Age: 40
Setting detail: OUTPATIENT SURGERY
Discharge: HOME | End: 2025-04-18
Attending: OTOLARYNGOLOGY | Admitting: OTOLARYNGOLOGY
Payer: COMMERCIAL

## 2025-04-18 ENCOUNTER — ANESTHESIA (OUTPATIENT)
Dept: OPERATING ROOM | Facility: CLINIC | Age: 40
End: 2025-04-18
Payer: COMMERCIAL

## 2025-04-18 ENCOUNTER — PHARMACY VISIT (OUTPATIENT)
Dept: PHARMACY | Facility: CLINIC | Age: 40
End: 2025-04-18
Payer: MEDICARE

## 2025-04-18 ENCOUNTER — ANESTHESIA EVENT (OUTPATIENT)
Dept: OPERATING ROOM | Facility: CLINIC | Age: 40
End: 2025-04-18
Payer: COMMERCIAL

## 2025-04-18 VITALS
BODY MASS INDEX: 34.45 KG/M2 | SYSTOLIC BLOOD PRESSURE: 120 MMHG | RESPIRATION RATE: 16 BRPM | OXYGEN SATURATION: 98 % | HEART RATE: 98 BPM | DIASTOLIC BLOOD PRESSURE: 69 MMHG | WEIGHT: 182.32 LBS | TEMPERATURE: 96.8 F

## 2025-04-18 DIAGNOSIS — R09.81 NASAL CONGESTION: ICD-10-CM

## 2025-04-18 DIAGNOSIS — R11.2 PONV (POSTOPERATIVE NAUSEA AND VOMITING): ICD-10-CM

## 2025-04-18 DIAGNOSIS — M95.0 ACQUIRED DEFORMITY OF NOSE: Primary | ICD-10-CM

## 2025-04-18 DIAGNOSIS — J34.3 HYPERTROPHY OF NASAL TURBINATES: ICD-10-CM

## 2025-04-18 DIAGNOSIS — J34.2 DEVIATED NASAL SEPTUM: ICD-10-CM

## 2025-04-18 DIAGNOSIS — Z98.890 PONV (POSTOPERATIVE NAUSEA AND VOMITING): ICD-10-CM

## 2025-04-18 LAB — PREGNANCY TEST URINE, POC: NEGATIVE

## 2025-04-18 PROCEDURE — 2500000004 HC RX 250 GENERAL PHARMACY W/ HCPCS (ALT 636 FOR OP/ED): Mod: JZ | Performed by: ANESTHESIOLOGIST ASSISTANT

## 2025-04-18 PROCEDURE — 2500000002 HC RX 250 W HCPCS SELF ADMINISTERED DRUGS (ALT 637 FOR MEDICARE OP, ALT 636 FOR OP/ED)

## 2025-04-18 PROCEDURE — 2500000005 HC RX 250 GENERAL PHARMACY W/O HCPCS: Performed by: OTOLARYNGOLOGY

## 2025-04-18 PROCEDURE — RXMED WILLOW AMBULATORY MEDICATION CHARGE

## 2025-04-18 PROCEDURE — 2720000007 HC OR 272 NO HCPCS: Performed by: OTOLARYNGOLOGY

## 2025-04-18 PROCEDURE — 3600000007 HC OR TIME - EACH INCREMENTAL 1 MINUTE - PROCEDURE LEVEL TWO: Performed by: OTOLARYNGOLOGY

## 2025-04-18 PROCEDURE — 30465 REPAIR NASAL STENOSIS: CPT | Performed by: OTOLARYNGOLOGY

## 2025-04-18 PROCEDURE — 81025 URINE PREGNANCY TEST: CPT | Performed by: OTOLARYNGOLOGY

## 2025-04-18 PROCEDURE — 2500000001 HC RX 250 WO HCPCS SELF ADMINISTERED DRUGS (ALT 637 FOR MEDICARE OP): Performed by: OTOLARYNGOLOGY

## 2025-04-18 PROCEDURE — 7100000001 HC RECOVERY ROOM TIME - INITIAL BASE CHARGE: Performed by: OTOLARYNGOLOGY

## 2025-04-18 PROCEDURE — 3700000002 HC GENERAL ANESTHESIA TIME - EACH INCREMENTAL 1 MINUTE: Performed by: OTOLARYNGOLOGY

## 2025-04-18 PROCEDURE — 30520 REPAIR OF NASAL SEPTUM: CPT | Performed by: OTOLARYNGOLOGY

## 2025-04-18 PROCEDURE — 2500000004 HC RX 250 GENERAL PHARMACY W/ HCPCS (ALT 636 FOR OP/ED)

## 2025-04-18 PROCEDURE — 30140 RESECT INFERIOR TURBINATE: CPT | Performed by: OTOLARYNGOLOGY

## 2025-04-18 PROCEDURE — 20912 REMOVE CARTILAGE FOR GRAFT: CPT | Performed by: OTOLARYNGOLOGY

## 2025-04-18 PROCEDURE — 7100000009 HC PHASE TWO TIME - INITIAL BASE CHARGE: Performed by: OTOLARYNGOLOGY

## 2025-04-18 PROCEDURE — 3700000001 HC GENERAL ANESTHESIA TIME - INITIAL BASE CHARGE: Performed by: OTOLARYNGOLOGY

## 2025-04-18 PROCEDURE — 7100000010 HC PHASE TWO TIME - EACH INCREMENTAL 1 MINUTE: Performed by: OTOLARYNGOLOGY

## 2025-04-18 PROCEDURE — 2500000004 HC RX 250 GENERAL PHARMACY W/ HCPCS (ALT 636 FOR OP/ED): Performed by: OTOLARYNGOLOGY

## 2025-04-18 PROCEDURE — 3600000002 HC OR TIME - INITIAL BASE CHARGE - PROCEDURE LEVEL TWO: Performed by: OTOLARYNGOLOGY

## 2025-04-18 PROCEDURE — 7100000002 HC RECOVERY ROOM TIME - EACH INCREMENTAL 1 MINUTE: Performed by: OTOLARYNGOLOGY

## 2025-04-18 PROCEDURE — 30930 THER FX NASAL INF TURBINATE: CPT | Performed by: OTOLARYNGOLOGY

## 2025-04-18 PROCEDURE — 96372 THER/PROPH/DIAG INJ SC/IM: CPT | Performed by: ANESTHESIOLOGIST ASSISTANT

## 2025-04-18 RX ORDER — METOCLOPRAMIDE HYDROCHLORIDE 5 MG/ML
10 INJECTION INTRAMUSCULAR; INTRAVENOUS ONCE AS NEEDED
Status: DISCONTINUED | OUTPATIENT
Start: 2025-04-18 | End: 2025-04-18 | Stop reason: HOSPADM

## 2025-04-18 RX ORDER — SUCCINYLCHOLINE CHLORIDE 20 MG/ML
INJECTION INTRAMUSCULAR; INTRAVENOUS AS NEEDED
Status: DISCONTINUED | OUTPATIENT
Start: 2025-04-18 | End: 2025-04-18

## 2025-04-18 RX ORDER — HYDROMORPHONE HYDROCHLORIDE 1 MG/ML
0.2 INJECTION, SOLUTION INTRAMUSCULAR; INTRAVENOUS; SUBCUTANEOUS EVERY 5 MIN PRN
Status: DISCONTINUED | OUTPATIENT
Start: 2025-04-18 | End: 2025-04-18 | Stop reason: HOSPADM

## 2025-04-18 RX ORDER — ONDANSETRON HYDROCHLORIDE 2 MG/ML
INJECTION, SOLUTION INTRAVENOUS AS NEEDED
Status: DISCONTINUED | OUTPATIENT
Start: 2025-04-18 | End: 2025-04-18

## 2025-04-18 RX ORDER — LIDOCAINE HYDROCHLORIDE 10 MG/ML
0.1 INJECTION, SOLUTION EPIDURAL; INFILTRATION; INTRACAUDAL; PERINEURAL ONCE
Status: DISCONTINUED | OUTPATIENT
Start: 2025-04-18 | End: 2025-04-18 | Stop reason: HOSPADM

## 2025-04-18 RX ORDER — ALBUTEROL SULFATE 0.83 MG/ML
2.5 SOLUTION RESPIRATORY (INHALATION) ONCE AS NEEDED
Status: DISCONTINUED | OUTPATIENT
Start: 2025-04-18 | End: 2025-04-18 | Stop reason: HOSPADM

## 2025-04-18 RX ORDER — ACETAMINOPHEN 10 MG/ML
INJECTION, SOLUTION INTRAVENOUS AS NEEDED
Status: DISCONTINUED | OUTPATIENT
Start: 2025-04-18 | End: 2025-04-18

## 2025-04-18 RX ORDER — ONDANSETRON 4 MG/1
4 TABLET, ORALLY DISINTEGRATING ORAL EVERY 8 HOURS PRN
Qty: 9 TABLET | Refills: 0 | Status: SHIPPED | OUTPATIENT
Start: 2025-04-18 | End: 2025-04-21

## 2025-04-18 RX ORDER — OXYCODONE HYDROCHLORIDE 5 MG/1
5 TABLET ORAL EVERY 4 HOURS PRN
Status: DISCONTINUED | OUTPATIENT
Start: 2025-04-18 | End: 2025-04-18 | Stop reason: HOSPADM

## 2025-04-18 RX ORDER — CEPHALEXIN 500 MG/1
500 CAPSULE ORAL 2 TIMES DAILY
Qty: 14 CAPSULE | Refills: 0 | Status: SHIPPED | OUTPATIENT
Start: 2025-04-18 | End: 2025-04-25

## 2025-04-18 RX ORDER — LIDOCAINE HCL/PF 100 MG/5ML
SYRINGE (ML) INTRAVENOUS AS NEEDED
Status: DISCONTINUED | OUTPATIENT
Start: 2025-04-18 | End: 2025-04-18

## 2025-04-18 RX ORDER — SODIUM CHLORIDE 0.9 G/100ML
INJECTION, SOLUTION IRRIGATION AS NEEDED
Status: DISCONTINUED | OUTPATIENT
Start: 2025-04-18 | End: 2025-04-18 | Stop reason: HOSPADM

## 2025-04-18 RX ORDER — MUPIROCIN 20 MG/G
1 OINTMENT TOPICAL
Qty: 15 G | Refills: 0 | Status: SHIPPED | OUTPATIENT
Start: 2025-04-18 | End: 2025-05-18

## 2025-04-18 RX ORDER — TRANEXAMIC ACID 100 MG/ML
INJECTION, SOLUTION INTRAVENOUS AS NEEDED
Status: DISCONTINUED | OUTPATIENT
Start: 2025-04-18 | End: 2025-04-18 | Stop reason: HOSPADM

## 2025-04-18 RX ORDER — SODIUM CHLORIDE, SODIUM LACTATE, POTASSIUM CHLORIDE, CALCIUM CHLORIDE 600; 310; 30; 20 MG/100ML; MG/100ML; MG/100ML; MG/100ML
100 INJECTION, SOLUTION INTRAVENOUS CONTINUOUS
Status: SHIPPED | OUTPATIENT
Start: 2025-04-18 | End: 2025-04-18

## 2025-04-18 RX ORDER — OXYCODONE HYDROCHLORIDE 5 MG/1
5 TABLET ORAL EVERY 6 HOURS PRN
Qty: 20 TABLET | Refills: 0 | Status: SHIPPED | OUTPATIENT
Start: 2025-04-18 | End: 2025-04-23

## 2025-04-18 RX ORDER — PROPOFOL 10 MG/ML
INJECTION, EMULSION INTRAVENOUS AS NEEDED
Status: DISCONTINUED | OUTPATIENT
Start: 2025-04-18 | End: 2025-04-18

## 2025-04-18 RX ORDER — ONDANSETRON HYDROCHLORIDE 2 MG/ML
4 INJECTION, SOLUTION INTRAVENOUS ONCE AS NEEDED
Status: DISCONTINUED | OUTPATIENT
Start: 2025-04-18 | End: 2025-04-18 | Stop reason: HOSPADM

## 2025-04-18 RX ORDER — OXYMETAZOLINE HCL 0.05 %
SPRAY, NON-AEROSOL (ML) NASAL AS NEEDED
Status: DISCONTINUED | OUTPATIENT
Start: 2025-04-18 | End: 2025-04-18 | Stop reason: HOSPADM

## 2025-04-18 RX ORDER — LIDOCAINE HYDROCHLORIDE AND EPINEPHRINE 10; 10 UG/ML; MG/ML
INJECTION, SOLUTION INFILTRATION; PERINEURAL AS NEEDED
Status: DISCONTINUED | OUTPATIENT
Start: 2025-04-18 | End: 2025-04-18 | Stop reason: HOSPADM

## 2025-04-18 RX ORDER — AMOXICILLIN 250 MG
1 CAPSULE ORAL DAILY
Qty: 10 TABLET | Refills: 0 | Status: SHIPPED | OUTPATIENT
Start: 2025-04-18 | End: 2025-04-28

## 2025-04-18 RX ORDER — KETOROLAC TROMETHAMINE 30 MG/ML
INJECTION, SOLUTION INTRAMUSCULAR; INTRAVENOUS AS NEEDED
Status: DISCONTINUED | OUTPATIENT
Start: 2025-04-18 | End: 2025-04-18

## 2025-04-18 RX ORDER — SODIUM CHLORIDE, SODIUM LACTATE, POTASSIUM CHLORIDE, CALCIUM CHLORIDE 600; 310; 30; 20 MG/100ML; MG/100ML; MG/100ML; MG/100ML
INJECTION, SOLUTION INTRAVENOUS CONTINUOUS PRN
Status: DISCONTINUED | OUTPATIENT
Start: 2025-04-18 | End: 2025-04-18

## 2025-04-18 RX ORDER — OXYMETAZOLINE HCL 0.05 %
2 SPRAY, NON-AEROSOL (ML) NASAL 2 TIMES DAILY PRN
Qty: 30 ML | Refills: 0 | Status: SHIPPED | OUTPATIENT
Start: 2025-04-18 | End: 2025-05-18

## 2025-04-18 RX ORDER — FENTANYL CITRATE 50 UG/ML
INJECTION, SOLUTION INTRAMUSCULAR; INTRAVENOUS AS NEEDED
Status: DISCONTINUED | OUTPATIENT
Start: 2025-04-18 | End: 2025-04-18

## 2025-04-18 RX ORDER — MUPIROCIN 20 MG/G
OINTMENT TOPICAL AS NEEDED
Status: DISCONTINUED | OUTPATIENT
Start: 2025-04-18 | End: 2025-04-18 | Stop reason: HOSPADM

## 2025-04-18 RX ORDER — SCOPOLAMINE 1 MG/3D
PATCH, EXTENDED RELEASE TRANSDERMAL AS NEEDED
Status: DISCONTINUED | OUTPATIENT
Start: 2025-04-18 | End: 2025-04-18

## 2025-04-18 RX ORDER — PHENYLEPHRINE HCL IN 0.9% NACL 0.4MG/10ML
SYRINGE (ML) INTRAVENOUS AS NEEDED
Status: DISCONTINUED | OUTPATIENT
Start: 2025-04-18 | End: 2025-04-18

## 2025-04-18 RX ORDER — MIDAZOLAM HYDROCHLORIDE 1 MG/ML
INJECTION, SOLUTION INTRAMUSCULAR; INTRAVENOUS AS NEEDED
Status: DISCONTINUED | OUTPATIENT
Start: 2025-04-18 | End: 2025-04-18

## 2025-04-18 RX ORDER — EPINEPHRINE 1 MG/ML
INJECTION, SOLUTION, CONCENTRATE INTRAVENOUS AS NEEDED
Status: DISCONTINUED | OUTPATIENT
Start: 2025-04-18 | End: 2025-04-18 | Stop reason: HOSPADM

## 2025-04-18 RX ORDER — APREPITANT 40 MG/1
40 CAPSULE ORAL DAILY
Status: DISCONTINUED | OUTPATIENT
Start: 2025-04-18 | End: 2025-04-18 | Stop reason: HOSPADM

## 2025-04-18 RX ORDER — CEFAZOLIN 1 G/1
INJECTION, POWDER, FOR SOLUTION INTRAVENOUS AS NEEDED
Status: DISCONTINUED | OUTPATIENT
Start: 2025-04-18 | End: 2025-04-18

## 2025-04-18 RX ADMIN — PROPOFOL 150 MCG/KG/MIN: 10 INJECTION, EMULSION INTRAVENOUS at 07:42

## 2025-04-18 RX ADMIN — FENTANYL CITRATE 50 MCG: 50 INJECTION, SOLUTION INTRAMUSCULAR; INTRAVENOUS at 07:31

## 2025-04-18 RX ADMIN — KETOROLAC TROMETHAMINE 30 MG: 30 INJECTION, SOLUTION INTRAMUSCULAR; INTRAVENOUS at 09:27

## 2025-04-18 RX ADMIN — FENTANYL CITRATE 50 MCG: 50 INJECTION, SOLUTION INTRAMUSCULAR; INTRAVENOUS at 07:35

## 2025-04-18 RX ADMIN — DEXAMETHASONE SODIUM PHOSPHATE 8 MG: 4 INJECTION, SOLUTION INTRA-ARTICULAR; INTRALESIONAL; INTRAMUSCULAR; INTRAVENOUS; SOFT TISSUE at 07:40

## 2025-04-18 RX ADMIN — CEFAZOLIN 2 G: 330 INJECTION, POWDER, FOR SOLUTION INTRAMUSCULAR; INTRAVENOUS at 07:41

## 2025-04-18 RX ADMIN — SCOPOLAMINE 1 PATCH: 1.5 PATCH, EXTENDED RELEASE TRANSDERMAL at 07:20

## 2025-04-18 RX ADMIN — ACETAMINOPHEN 1000 MG: 10 INJECTION, SOLUTION INTRAVENOUS at 07:58

## 2025-04-18 RX ADMIN — SODIUM CHLORIDE, POTASSIUM CHLORIDE, SODIUM LACTATE AND CALCIUM CHLORIDE: 600; 310; 30; 20 INJECTION, SOLUTION INTRAVENOUS at 07:29

## 2025-04-18 RX ADMIN — Medication 40 MCG: at 08:22

## 2025-04-18 RX ADMIN — ONDANSETRON 4 MG: 2 INJECTION INTRAMUSCULAR; INTRAVENOUS at 09:20

## 2025-04-18 RX ADMIN — LIDOCAINE HYDROCHLORIDE 100 MG: 20 INJECTION INTRAVENOUS at 07:35

## 2025-04-18 RX ADMIN — APREPITANT 40 MG: 40 CAPSULE ORAL at 07:20

## 2025-04-18 RX ADMIN — Medication 20 MCG: at 08:17

## 2025-04-18 RX ADMIN — FENTANYL CITRATE 25 MCG: 50 INJECTION, SOLUTION INTRAMUSCULAR; INTRAVENOUS at 09:09

## 2025-04-18 RX ADMIN — SUCCINYLCHOLINE CHLORIDE 200 MG: 20 INJECTION, SOLUTION INTRAMUSCULAR; INTRAVENOUS at 07:35

## 2025-04-18 RX ADMIN — MIDAZOLAM 2 MG: 1 INJECTION INTRAMUSCULAR; INTRAVENOUS at 07:31

## 2025-04-18 RX ADMIN — PROPOFOL 200 MG: 10 INJECTION, EMULSION INTRAVENOUS at 07:35

## 2025-04-18 SDOH — HEALTH STABILITY: MENTAL HEALTH: CURRENT SMOKER: 0

## 2025-04-18 ASSESSMENT — PAIN SCALES - GENERAL
PAIN_LEVEL: 1
PAINLEVEL_OUTOF10: 0 - NO PAIN
PAINLEVEL_OUTOF10: 2
PAINLEVEL_OUTOF10: 2

## 2025-04-18 ASSESSMENT — COLUMBIA-SUICIDE SEVERITY RATING SCALE - C-SSRS
2. HAVE YOU ACTUALLY HAD ANY THOUGHTS OF KILLING YOURSELF?: NO
1. IN THE PAST MONTH, HAVE YOU WISHED YOU WERE DEAD OR WISHED YOU COULD GO TO SLEEP AND NOT WAKE UP?: NO
6. HAVE YOU EVER DONE ANYTHING, STARTED TO DO ANYTHING, OR PREPARED TO DO ANYTHING TO END YOUR LIFE?: NO

## 2025-04-18 ASSESSMENT — PAIN - FUNCTIONAL ASSESSMENT
PAIN_FUNCTIONAL_ASSESSMENT: UNABLE TO SELF-REPORT
PAIN_FUNCTIONAL_ASSESSMENT: 0-10
PAIN_FUNCTIONAL_ASSESSMENT: 0-10
PAIN_FUNCTIONAL_ASSESSMENT: UNABLE TO SELF-REPORT
PAIN_FUNCTIONAL_ASSESSMENT: 0-10
PAIN_FUNCTIONAL_ASSESSMENT: 0-10

## 2025-04-18 NOTE — INTERVAL H&P NOTE
H&P reviewed. The patient was examined and there are no changes to the H&P.    Plan to proceed with surgery today as scheduled.    Francesco Vieira MD PGY4  ENT

## 2025-04-18 NOTE — DISCHARGE INSTRUCTIONS
NASAL SURGERY  Important Phone Numbers  Dr. Clarence Doty: [phone]  Evenings/Weekends Emergency: [phone]  - please ask for the ENT resident on-call    At Home after Surgery:    Head Elevation: Keep your head elevated (the height of 2 pillows is appropriate) for 3 days to help with swelling.     Ice: Apply cold compresses to the cheeks and forehead, up to 20 minutes of each hour while awake after surgery, for the first 48 hours.  This will help reduce swelling and bruising.     Nasal Packing: If you have nasal packing in place with strings hanging out of your nose, you will remove it at home 24 or 48 hours after the operation (as directed by Dr. Doty) by pulling on the strings beneath the nose.  Please call the office if you are struggling to remove it.  If you have splints inside the nose that are sutured into place, they will be removed at your follow-up appointment.    Nasal Care: Use nasal saline spray, 4 sprays to each nostril every 2-3 hours while awake.  This will help keep the nasal passages moist and prevent scabbing in the nose.  It is normal to feel congested for several weeks after surgery.  Do not blow your nose for two weeks after surgery.      Incision/Cast Care: If you have an incision on the nose, apply antibiotic ointment four times a day.  The incision will heal most optimally if it is kept moist and clean.  You can use hydrogen peroxide on Q-tips to gently clean any crusts.  Do not rub but gently dab the incision to clean.   If you have a cast or dressing on the outside of your nose, this will remain in place until follow-up.  If the cast falls off, do not worry.  Tape it to your nose at nighttime while you sleep and if/when you wear glasses.      Shower: You may shower 24 hours after surgery.  Do not let the shower spray hit your face/nose directly and do not soak your face in water.  If you have a cast or dressing on the outside of the nose, try to keep it as dry as possible.  Towel  blot your nose/cast after your shower.    Bleeding: Most patients have mild, active bleeding the first night.  Some blood-tinged drainage is normal for 1-2 weeks after surgery.  Afrin nasal spray may be helpful for bleeding after surgery but should not be used for more than 3 days.  Excessive bleeding that does not stop is not expected; please call the office or seek medical attention if this occurs.    Medications: Take the medications as prescribed.  You can take Tylenol in addition to the narcotic pain medication prescribed.  Resume all home medications the night of surgery unless otherwise directed.      Activity: Resume normal activities of daily living, as you feel able.  However, avoid strenuous activity and heavy lifting (more than 10 lbs) for 3 weeks after surgery.  Light activity such as walking may be resumed after 1 week after surgery.  Sport activities may be resumed 1 month after surgery but try to protect your nose as it is still healing.    Seek Medical Attention: Call the office or seek medical attention if you develop fever greater than 101 degrees, excessive bleeding, excessive pain that is not well-controlled, skin rash, visual disturbances, or other unusual symptoms.     Follow-Up Care:  First Appointment: You will return one week after surgery for an appointment for suture and cast/dressing removal.  There are additional sutures inside your nose that will dissolve on their own.    Postoperative Healing: Your nose will be swollen and will remain so for several weeks.  It is important to keep in mind that although much of the swelling resolves over the first several weeks after surgery, it takes 12 to 15 months for all of the swelling in the nose to resolve.  However, most patients have a good appearance even 2-3 weeks after the operation.      Additional Appointments: Ideally, we would like to see you back between within 1-2 weeks after surgery. Subsequently, our follow up will be approximately  4-6 weeks, then 4-6 months after surgery to examine the healing. After this, the follow-up is quite variable, and depends on how you are doing and feeling. Often, this means visits at about 12 months after surgery to follow your healing process.  Please call the office at any time if you have any questions or concerns and would like to be seen sooner than your next scheduled visit.        May have Tylenol after 2PM.    May have Ibuprofen/motrin/Advil after 3:30PM.     Scopalamine patch may stay on for 72 hours.  Remove patch, discard away from pets and children.  Do not touch eyes.  WASH HANDS THOROUGHLY!!!

## 2025-04-18 NOTE — ANESTHESIA POSTPROCEDURE EVALUATION
Patient: Adriana Gutierrez    Procedure Summary       Date: 04/18/25 Room / Location: [facility] OR 03 / [facility] [place] [facility]    Anesthesia Start: 0728 Anesthesia Stop: 0940    Procedure: SEPTOPLASTY, TURBINATE REDUCTION, NASAL VALVE REPAIR (Bilateral) Diagnosis:       Nasal congestion      Deviated nasal septum      Hypertrophy of nasal turbinates      Acquired deformity of nose      (Nasal congestion [R09.81])      (Deviated nasal septum [J34.2])      (Hypertrophy of nasal turbinates [J34.3])      (Acquired deformity of nose [M95.0])    Surgeons: Clarence Doty MD Responsible Provider: Urbano Restrepo MD    Anesthesia Type: general ASA Status: 2            Anesthesia Type: general    Vitals Value Taken Time   /73 04/18/25 09:45   Temp 36 °C (96.8 °F) 04/18/25 09:39   Pulse 90 04/18/25 09:45   Resp 14 04/18/25 09:45   SpO2 100 % 04/18/25 09:45       Anesthesia Post Evaluation    Patient location during evaluation: PACU  Patient participation: complete - patient cannot participate  Level of consciousness: awake and alert  Pain score: 1  Pain management: adequate  Multimodal analgesia pain management approach  Airway patency: fixed obstruction  Two or more strategies used to mitigate risk of obstructive sleep apnea  Cardiovascular status: acceptable  Respiratory status: acceptable  Hydration status: acceptable  Postoperative Nausea and Vomiting: none        No notable events documented.

## 2025-04-18 NOTE — ANESTHESIA POSTPROCEDURE EVALUATION
Patient: Adriana Gutierrez    Procedure Summary       Date: 04/18/25 Room / Location: [facility] OR 03 / [facility]    Anesthesia Start: 0728 Anesthesia Stop: 0940    Procedure: SEPTOPLASTY, TURBINATE REDUCTION, NASAL VALVE REPAIR (Bilateral) Diagnosis:       Nasal congestion      Deviated nasal septum      Hypertrophy of nasal turbinates      Acquired deformity of nose      (Nasal congestion [R09.81])      (Deviated nasal septum [J34.2])      (Hypertrophy of nasal turbinates [J34.3])      (Acquired deformity of nose [M95.0])    Surgeons: Clarence Doty MD Responsible Provider: Urbano Restrepo MD    Anesthesia Type: general ASA Status: 2            Anesthesia Type: general    Vitals Value Taken Time   /73 04/18/25 09:45   Temp 36 °C (96.8 °F) 04/18/25 09:39   Pulse 90 04/18/25 09:45   Resp 14 04/18/25 09:45   SpO2 100 % 04/18/25 09:45       Anesthesia Post Evaluation    Patient location during evaluation: PACU  Patient participation: complete - patient participated  Level of consciousness: awake  Pain management: satisfactory to patient  Multimodal analgesia pain management approach  Airway patency: patent  Cardiovascular status: acceptable  Respiratory status: acceptable  Hydration status: stable  Postoperative Nausea and Vomiting: none  Comments: Did well        No notable events documented.

## 2025-04-18 NOTE — ANESTHESIA PROCEDURE NOTES
Peripheral IV  Date/Time: 4/18/2025 7:00 AM      Placement  Needle size: 22 G  Laterality: left  Location: hand  Local anesthetic: injectable  Site prep: alcohol  Technique: anatomical landmarks  Attempts: 1

## 2025-04-18 NOTE — ANESTHESIA PROCEDURE NOTES
Airway  Date/Time: 4/18/2025 7:41 AM  Reason: elective    Airway not difficult    Staffing  Performed: SRINI   Authorized by: Urbano Restrepo MD    Performed by: SRINI Sue  Patient location during procedure: OR    Patient Condition  Indications for airway management: anesthesia and airway protection  Patient position: sniffing  Sedation level: deep     Final Airway Details   Preoxygenated: yes  Final airway type: endotracheal airway  Successful airway: ETT  Cuffed: yes   Successful intubation technique: direct laryngoscopy  Blade: Jimena  Blade size: #3  ETT size (mm): 7.0  Cormack-Lehane Classification: grade I - full view of glottis  Placement verified by: chest auscultation and capnometry   Measured from: gums  ETT to gums (cm): 21  Ventilation between attempts: BVM  Number of attempts at approach: 1

## 2025-04-18 NOTE — ANESTHESIA PREPROCEDURE EVALUATION
Patient: Adriana Gutierrez    Procedure Information       Date/Time: 04/18/25 0715    Procedure: SEPTOPLASTY, TURBINATE REDUCTION, NASAL VALVE REPAIR (Bilateral)    Location: [facility] OR 03 / [facility] OR    Surgeons: Clarence Doty MD            Relevant Problems   Anesthesia   (+) PONV (postoperative nausea and vomiting)      Cardiac (within normal limits)      Pulmonary (within normal limits)      Neuro   (+) Depression, recurrent (CMS-HCC)   (+) AGUSTINA (generalized anxiety disorder)   (+) Panic attacks      GI   (+) Gastroesophageal reflux disease without esophagitis      Liver (within normal limits)      Endocrine (within normal limits)      Hematology (within normal limits)      Musculoskeletal (within normal limits)      HEENT   (+) Chronic ethmoidal sinusitis      Skin   (+) Chronic eczematous otitis externa of both ears       Clinical information reviewed:    Allergies  Meds               NPO Detail:  No data recorded     Physical Exam    Airway  Mallampati: II  TM distance: >3 FB  Mouth opening: 3 or more finger widths     Cardiovascular - normal exam   Dental - normal exam     Pulmonary - normal exam   Abdominal          Anesthesia Plan    History of general anesthesia?: yes  History of complications of general anesthesia?: yes    ASA 2     general     The patient is not a current smoker.    intravenous induction   Anesthetic plan and risks discussed with patient.    Plan discussed with attending.

## 2025-04-18 NOTE — OP NOTE
SEPTOPLASTY, TURBINATE REDUCTION, NASAL VALVE REPAIR (B) Operative Note     Date: 2025  [place] [facility] [place]    Name: Adriana Gutierrez : 1985, Age: 40 y.o., MRN: [id number], Sex: female    Diagnosis  Pre-op Diagnosis      * Nasal congestion [R09.81]     * Deviated nasal septum [J34.2]     * Hypertrophy of nasal turbinates [J34.3]     * Acquired deformity of nose [M95.0] Post-op Diagnosis     * Nasal congestion [R09.81]     * Deviated nasal septum [J34.2]     * Hypertrophy of nasal turbinates [J34.3]     * Acquired deformity of nose [M95.0]     Procedures  SEPTOPLASTY, TURBINATE REDUCTION, NASAL VALVE REPAIR  96001 - MA SEPTOPLASTY/SUBMUCOUS RESECJ W/WO CARTILAGE GRF    SEPTOPLASTY, TURBINATE REDUCTION, NASAL VALVE REPAIR  35872 - MA CARTILAGE GRAFT NASAL SEPTUM    SEPTOPLASTY, TURBINATE REDUCTION, NASAL VALVE REPAIR  54510 - MA REPAIR NASAL VESTIBULAR STENOSIS    SEPTOPLASTY, TURBINATE REDUCTION, NASAL VALVE REPAIR  72926 - MA FRACTURE NASAL INFERIOR TURBINATE THERAPEUTIC    SEPTOPLASTY, TURBINATE REDUCTION, NASAL VALVE REPAIR  25424 - MA SUBMUCOSAL RESECTION TURBINATES      Surgeons      * Clarence Doty - Primary    Resident/Fellow/Other Assistant:  Surgeons and Role:  * No surgeons found with a matching role *    Staff:   Circulator: Nubia Calixub Person: Donna Rosa Person: Sarita Bustos Circulator: Aliyah    Anesthesia Staff: Anesthesiologist: Urbano Restrepo MD  C-AA: SRINI Sue    Procedure Summary  Anesthesia: Anesthesia type not filed in the log.  ASA: II  Estimated Blood Loss: 10 mL  Intra-op Medications:   Administrations occurring from 0715 to 0945 on 25:   Medication Name Total Dose   oxymetazoline (Afrin) 0.05 % nasal spray 10 mL   lidocaine-epinephrine (Xylocaine W/EPI) 1 %-1:100,000 injection 18 mL   tranexamic acid (Cyklokapron) injection 2 mL   balanced salts (BSS) intraocular solution 10 mL   EPINEPHrine HCl (PF) (Adrenalin) injection 2 mg   mupirocin  (Bactroban) 2 % ointment 1 Application   sodium chloride 0.9 % irrigation solution 1,000 mL   acetaminophen (Ofirmev) injection 1,000 mg   aprepitant (Emend) capsule 40 mg 40 mg   ceFAZolin (Ancef) 1 gram/ 3 mL injection - reconstituted 330 mg/mL 2 g   dexAMETHasone (Decadron) injection 4 mg/mL 8 mg   fentaNYL PF 0.05 mg/mL 125 mcg   ketorolac (Toradol) injection 30 mg 30 mg   lactated Ringer's infusion Cannot be calculated   lidocaine (cardiac) injection 2% prefilled syringe 100 mg   midazolam (Versed) 1 mg/1 mL 2 mg   ondansetron (Zofran) 2 mg/mL injection 4 mg   phenylephrine 40 mcg/mL syringe 10 mL 60 mcg   50 mL propofol 10mg/mL 849.2 mg   scopolamine patch 1 patch   succinylcholine (Anectine) 20 mg/mL injection 200 mg              Anesthesia Record               Intraprocedure I/O Totals          Intake    Propofol Drip 0.00 mL    The total shown is the total volume documented since Anesthesia Start was filed.    acetaminophen 1,000 mg/100 mL (10 mg/mL) 100.00 mL    Total Intake 100 mL          Specimen: No specimens collected              Drains and/or Catheters: * None in log *    Tourniquet Times:         Implants:     Findings: see op note    Indications: Adriana Gutierrez is an 40 y.o. female who is having surgery for Nasal congestion [R09.81]  Deviated nasal septum [J34.2]  Hypertrophy of nasal turbinates [J34.3]  Acquired deformity of nose [M95.0].     The patient was seen in the preoperative area. The risks, benefits, complications, treatment options, non-operative alternatives, expected recovery and outcomes were discussed with the patient. The possibilities of reaction to medication, pulmonary aspiration, injury to surrounding structures, bleeding, recurrent infection, the need for additional procedures, failure to diagnose a condition, and creating a complication requiring transfusion or operation were discussed with the patient. The patient concurred with the proposed plan, giving informed consent.   The site of surgery was properly noted/marked if necessary per policy. The patient has been actively warmed in preoperative area. Preoperative antibiotics have been ordered and given within 1 hours of incision. Venous thrombosis prophylaxis have been ordered including bilateral sequential compression devices    Procedure Details:     The patient was brought to the operating room and placed in the supine position, then intubated under general anesthesia.  The nose was injected with 1% lidocaine with epinephrine and then packed with decongestant-soaked pledgets.  The face was prepped and draped in the usual sterile fashion.  A left hemitransfixion incision was performed and mucoperichondrial flaps were elevated on the left and subsequently the right after crossing over the cartilage.  The deviated septum was treated by removing deformed quadrangular cartilage and bony septum.    Septal cartilage was harvested, taking care to preserve a >1.0 cm L-shaped strut.     To optimize the shape and position of the external nasal valve and alar rim, alar rim incisions were performed and precise pockets were elevated on each side and alar rim strut grafts were placed. These were fashioned from septal cartilage    Bilateral inferior turbinate reduction and lateralization was then performed.  A suction coblation debrider was used to perform intramural coblation for submucous resection bilaterally.  A boise was then used to outfracture the inferior turbinate bilaterally.     We then turned our attention to closure.  Meticulous closure was performed using gut suture in a simple, interrupted fashion.  The nose was gently cleansed with sterile saline and an external nasal splint was placed in the usual fashion.  This concluded the goals of the procedure.  The patient was turned over to Anesthesia, extubated, and transferred to the PACU.    Complications:  None; patient tolerated the procedure well.    Disposition: PACU -  hemodynamically stable.  Condition: stable                 Additional Details: None    Attending Attestation: I was present and scrubbed for the entire procedure.    Clarence Doty  Phone Number: [phone]

## 2025-04-21 ASSESSMENT — PAIN SCALES - GENERAL: PAINLEVEL_OUTOF10: 0 - NO PAIN

## 2025-04-23 ENCOUNTER — APPOINTMENT (OUTPATIENT)
Facility: CLINIC | Age: 40
End: 2025-04-23
Payer: COMMERCIAL

## 2025-04-23 DIAGNOSIS — J34.89 NASAL OBSTRUCTION: ICD-10-CM

## 2025-04-23 DIAGNOSIS — J34.2 DEVIATED SEPTUM: Primary | ICD-10-CM

## 2025-04-23 DIAGNOSIS — R06.89 DIFFICULTY BREATHING: ICD-10-CM

## 2025-04-23 PROCEDURE — 99024 POSTOP FOLLOW-UP VISIT: CPT | Performed by: PHYSICIAN ASSISTANT

## 2025-05-12 ENCOUNTER — APPOINTMENT (OUTPATIENT)
Dept: DERMATOLOGY | Facility: CLINIC | Age: 40
End: 2025-05-12
Payer: COMMERCIAL

## 2025-05-12 DIAGNOSIS — D18.01 HEMANGIOMA OF SKIN: ICD-10-CM

## 2025-05-12 DIAGNOSIS — L81.4 LENTIGO: ICD-10-CM

## 2025-05-12 DIAGNOSIS — L90.5 SCAR CONDITIONS AND FIBROSIS OF SKIN: Primary | ICD-10-CM

## 2025-05-12 DIAGNOSIS — Z85.820 PERSONAL HISTORY OF MALIGNANT MELANOMA OF SKIN: ICD-10-CM

## 2025-05-12 DIAGNOSIS — D22.9 MELANOCYTIC NEVUS, UNSPECIFIED LOCATION: ICD-10-CM

## 2025-05-12 PROCEDURE — 1036F TOBACCO NON-USER: CPT | Performed by: DERMATOLOGY

## 2025-05-12 PROCEDURE — 99213 OFFICE O/P EST LOW 20 MIN: CPT | Performed by: DERMATOLOGY

## 2025-05-12 ASSESSMENT — DERMATOLOGY QUALITY OF LIFE (QOL) ASSESSMENT
RATE HOW EMOTIONALLY BOTHERED YOU ARE BY YOUR SKIN PROBLEM (FOR EXAMPLE, WORRY, EMBARRASSMENT, FRUSTRATION): 2
RATE HOW BOTHERED YOU ARE BY EFFECTS OF YOUR SKIN PROBLEMS ON YOUR ACTIVITIES (EG, GOING OUT, ACCOMPLISHING WHAT YOU WANT, WORK ACTIVITIES OR YOUR RELATIONSHIPS WITH OTHERS): 0 - NEVER BOTHERED
RATE HOW BOTHERED YOU ARE BY SYMPTOMS OF YOUR SKIN PROBLEM (EG, ITCHING, STINGING BURNING, HURTING OR SKIN IRRITATION): 3
RATE HOW EMOTIONALLY BOTHERED YOU ARE BY YOUR SKIN PROBLEM (FOR EXAMPLE, WORRY, EMBARRASSMENT, FRUSTRATION): 2
WHAT SINGLE SKIN CONDITION LISTED BELOW IS THE PATIENT ANSWERING THE QUALITY-OF-LIFE ASSESSMENT QUESTIONS ABOUT: URTICARIA
WHAT SINGLE SKIN CONDITION LISTED BELOW IS THE PATIENT ANSWERING THE QUALITY-OF-LIFE ASSESSMENT QUESTIONS ABOUT: URTICARIA
RATE HOW BOTHERED YOU ARE BY EFFECTS OF YOUR SKIN PROBLEMS ON YOUR ACTIVITIES (EG, GOING OUT, ACCOMPLISHING WHAT YOU WANT, WORK ACTIVITIES OR YOUR RELATIONSHIPS WITH OTHERS): 0 - NEVER BOTHERED
RATE HOW BOTHERED YOU ARE BY SYMPTOMS OF YOUR SKIN PROBLEM (EG, ITCHING, STINGING BURNING, HURTING OR SKIN IRRITATION): 3
DATE THE QUALITY-OF-LIFE ASSESSMENT WAS COMPLETED: 67337

## 2025-05-12 NOTE — Clinical Note
Tristan Angiomas  - Discussed with patient these are benign vascular lesions and no treatment is necessary.

## 2025-05-12 NOTE — PROGRESS NOTES
Subjective     Adriana Gutierrez is a 40 y.o. female who presents for the following: Skin Check (Annual. Hx of MM, AJMN, and Sev. DN. Pt reports area of concern located Left Lower Abdomen. /).     Skin Cancer Screening  She has a history of heavy sun exposure. She is in the sun daily. She uses sunscreen daily. She reports no skin symptoms. Her moles are not changing.    Spots that concern her: Left Lower Abdomen.       Hx of MM - 2019, AJMN- 10/2023, and Sev. DN - 2020      Review of Systems:  No other skin or systemic complaints other than what is documented elsewhere in the note.    The following portions of the chart were reviewed this encounter and updated as appropriate:       Skin Cancer History  Biopsy Log Book  No skin cancers from Specimen Tracking.    Additional History      Specialty Problems          Dermatology Problems    Dysplastic nevus    Acne vulgaris    Hemangioma of skin and subcutaneous tissue    Keloid    Melanocytic nevi of right lower limb, including hip    Melanocytic nevi of trunk    Melanocytic nevi of unspecified upper limb, including shoulder    Neoplasm of uncertain behavior of skin    Other hypertrophic disorders of the skin    Other melanin hyperpigmentation    Personal history of malignant melanoma of skin    Scar condition and fibrosis of skin    Xerosis cutis    Contact dermatitis due to metal     Past Medical History:  Adriana Gutierrez  has a past medical history of Acne, ADHD (attention deficit hyperactivity disorder), Allergic, Anxiety, Depression, Deviated septum, Dysplastic nevus (03/03/2020), Dysplastic nevus (10/31/2019), Dysplastic nevus (10/17/2019), Encounter for screening for infections with a predominantly sexual mode of transmission (02/15/2018), GERD (gastroesophageal reflux disease), Headache, Heart murmur, Melanoma (Multi) (10/2019), Other specified health status (02/15/2018), Personal history of other diseases of the female genital tract (02/15/2018), Personal history  of other infectious and parasitic diseases (12/15/2014), PONV (postoperative nausea and vomiting), Sleep apnea, and Urticaria.    Past Surgical History:  Adriana Gutierrez  has a past surgical history that includes Dilation and curettage of uterus (12/15/2014); Mouth surgery (12/15/2014); Skin biopsy; Breast surgery (March 19, 2021); and [place] tooth extraction (2009).    Family History:  Patient family history includes Allergies in an other family member; Arthritis in her maternal grandmother and mother; Cancer in her father's sister; Diabetes in her mother; Eczema in her sister; Hypertension in her mother; No Known Problems in her brother; Stroke in her paternal grandmother.       Objective   Well appearing patient in no apparent distress; mood and affect are within normal limits.    A full examination was performed including scalp, head, eyes, ears, nose, lips, neck, chest, axillae, abdomen, back, buttocks, bilateral upper extremities, bilateral lower extremities, hands, feet, fingers, toes, fingernails, and toenails. All findings within normal limits unless otherwise noted below.    Assessment/Plan   Skin Exam  1. HEMANGIOMA OF SKIN  Generalized  Scattered cherry-red papule(s).  Cherry Angiomas  - Discussed with patient these are benign vascular lesions and no treatment is necessary.     2. MELANOCYTIC NEVUS, UNSPECIFIED LOCATION  Generalized  All nevi were symmetric brown macules without atypia on dermoscopy.   The ABCDEs of melanoma and warning signs of non-melanoma skin cancer were discussed with patient and patient expressed understanding. Sun protection and use of at least SPF 30 discussed with patient. Pt instructed to reapply every 2 hours.   3. LENTIGO  Generalized  Scattered tan macules in sun-exposed areas.  The ABCDEs of melanoma and warning signs of non-melanoma skin cancer were discussed with patient and patient expressed understanding. Sun protection and use of at least SPF 30 discussed with patient.  Pt instructed to reapply every 2 hours.   4. PERSONAL HISTORY OF MALIGNANT MELANOMA OF SKIN  Generalized  No signs of recurrence  - continue regular skin checks  - also reviewed regular eye and dental exams  5. SCAR CONDITIONS AND FIBROSIS OF SKIN  Generalized  Scar is well-healed without evidence of recurrence (under chin, R popliteal fossa)  No palpable JUAN F on exam  - no signs of recurrence or clinical adenopathy on exam  - continue regular skin checks     Follow up in 1 year or sooner as needed.

## 2025-05-12 NOTE — Clinical Note
Scar is well-healed without evidence of recurrence (under chin, R popliteal fossa)  No palpable JUAN F on exam

## 2025-05-23 ENCOUNTER — TELEPHONE (OUTPATIENT)
Dept: OTOLARYNGOLOGY | Facility: CLINIC | Age: 40
End: 2025-05-23
Payer: COMMERCIAL

## 2025-05-23 NOTE — TELEPHONE ENCOUNTER
Received message from patient that she is having left sided nasal congestion. Tried to contact patient and left a detailed voicemail offering her an appointment to come to our next clinic on 5/28 to be evaluated. Recommended patient go to urgent care over the weekend if she is concerned and provided the emergency after hours phone number. Encouraged patient to continue using saline spray and humidification.

## 2025-05-29 ENCOUNTER — APPOINTMENT (OUTPATIENT)
Dept: DERMATOLOGY | Facility: CLINIC | Age: 40
End: 2025-05-29
Payer: COMMERCIAL

## 2025-06-30 DIAGNOSIS — E55.9 HYPOVITAMINOSIS D: ICD-10-CM

## 2025-06-30 RX ORDER — ERGOCALCIFEROL 1.25 MG/1
CAPSULE ORAL
Qty: 12 CAPSULE | Refills: 0 | OUTPATIENT
Start: 2025-06-30

## 2025-08-19 ENCOUNTER — APPOINTMENT (OUTPATIENT)
Dept: SLEEP MEDICINE | Facility: CLINIC | Age: 40
End: 2025-08-19
Payer: COMMERCIAL

## 2025-08-19 VITALS
SYSTOLIC BLOOD PRESSURE: 134 MMHG | HEART RATE: 82 BPM | BODY MASS INDEX: 36.25 KG/M2 | TEMPERATURE: 98.6 F | WEIGHT: 192 LBS | HEIGHT: 61 IN | RESPIRATION RATE: 16 BRPM | OXYGEN SATURATION: 100 % | DIASTOLIC BLOOD PRESSURE: 83 MMHG

## 2025-08-19 DIAGNOSIS — G25.81 RESTLESS LEG SYNDROME: ICD-10-CM

## 2025-08-19 DIAGNOSIS — E66.9 OBESITY (BMI 35.0-39.9 WITHOUT COMORBIDITY): ICD-10-CM

## 2025-08-19 DIAGNOSIS — E83.10 DISORDER OF IRON METABOLISM: ICD-10-CM

## 2025-08-19 DIAGNOSIS — G47.33 OBSTRUCTIVE SLEEP APNEA SYNDROME: Primary | ICD-10-CM

## 2025-08-19 DIAGNOSIS — Z72.821 INADEQUATE SLEEP HYGIENE: ICD-10-CM

## 2025-08-19 PROCEDURE — 3008F BODY MASS INDEX DOCD: CPT | Performed by: PHYSICIAN ASSISTANT

## 2025-08-19 PROCEDURE — 99214 OFFICE O/P EST MOD 30 MIN: CPT | Performed by: PHYSICIAN ASSISTANT

## 2025-08-19 RX ORDER — LISDEXAMFETAMINE DIMESYLATE 40 MG/1
CAPSULE ORAL
COMMUNITY
Start: 2025-08-14

## 2025-08-19 RX ORDER — FLUTICASONE PROPIONATE 50 MCG
1 SPRAY, SUSPENSION (ML) NASAL DAILY
COMMUNITY

## 2025-08-19 ASSESSMENT — LIFESTYLE VARIABLES
SKIP TO QUESTIONS 9-10: 1
HOW MANY STANDARD DRINKS CONTAINING ALCOHOL DO YOU HAVE ON A TYPICAL DAY: 1 OR 2
HOW OFTEN DO YOU HAVE SIX OR MORE DRINKS ON ONE OCCASION: NEVER
AUDIT-C TOTAL SCORE: 1
HOW OFTEN DO YOU HAVE A DRINK CONTAINING ALCOHOL: MONTHLY OR LESS

## 2025-08-19 ASSESSMENT — PAIN SCALES - GENERAL: PAINLEVEL_OUTOF10: 0-NO PAIN

## 2025-08-19 ASSESSMENT — ENCOUNTER SYMPTOMS
LOSS OF SENSATION IN FEET: 0
OCCASIONAL FEELINGS OF UNSTEADINESS: 0
DEPRESSION: 0

## 2025-08-20 ENCOUNTER — APPOINTMENT (OUTPATIENT)
Dept: OTOLARYNGOLOGY | Facility: CLINIC | Age: 40
End: 2025-08-20
Payer: COMMERCIAL

## 2025-08-20 VITALS
SYSTOLIC BLOOD PRESSURE: 141 MMHG | DIASTOLIC BLOOD PRESSURE: 89 MMHG | BODY MASS INDEX: 36.25 KG/M2 | WEIGHT: 192 LBS | HEIGHT: 61 IN | HEART RATE: 112 BPM

## 2025-08-20 DIAGNOSIS — J34.8210 NASAL ALAR COLLAPSE: ICD-10-CM

## 2025-08-20 DIAGNOSIS — R06.89 DIFFICULTY BREATHING: Primary | ICD-10-CM

## 2025-08-20 PROCEDURE — 3008F BODY MASS INDEX DOCD: CPT | Performed by: PHYSICIAN ASSISTANT

## 2025-08-20 PROCEDURE — 99213 OFFICE O/P EST LOW 20 MIN: CPT | Performed by: PHYSICIAN ASSISTANT

## 2025-08-20 RX ORDER — DEXTROAMPHETAMINE SACCHARATE, AMPHETAMINE ASPARTATE, DEXTROAMPHETAMINE SULFATE AND AMPHETAMINE SULFATE 2.5; 2.5; 2.5; 2.5 MG/1; MG/1; MG/1; MG/1
TABLET ORAL
COMMUNITY
Start: 2025-07-22

## 2025-08-20 ASSESSMENT — PAIN SCALES - GENERAL: PAINLEVEL_OUTOF10: 0-NO PAIN

## 2025-08-25 DIAGNOSIS — E66.9 OBESITY (BMI 35.0-39.9 WITHOUT COMORBIDITY): Primary | ICD-10-CM

## 2025-09-02 ENCOUNTER — APPOINTMENT (OUTPATIENT)
Dept: SLEEP MEDICINE | Facility: CLINIC | Age: 40
End: 2025-09-02
Payer: COMMERCIAL

## 2026-03-04 ENCOUNTER — APPOINTMENT (OUTPATIENT)
Dept: ALLERGY | Facility: CLINIC | Age: 41
End: 2026-03-04
Payer: COMMERCIAL

## 2026-05-18 ENCOUNTER — APPOINTMENT (OUTPATIENT)
Dept: DERMATOLOGY | Facility: CLINIC | Age: 41
End: 2026-05-18
Payer: COMMERCIAL

## (undated) DEVICE — CLEANER, WIPE, INSTRUMENT, 3.25 X 3.25 IN

## (undated) DEVICE — NEEDLE, HYPODERMIC, REGULAR WALL, REGULAR BEVEL, 18 G X 1.5 IN

## (undated) DEVICE — DRESSING, GAUZE, SPONGE, 12 PLY, 4 X 4 IN, PLASTIC POUCH, STRL 10PK

## (undated) DEVICE — HOLSTER, JET SAFETY

## (undated) DEVICE — Device

## (undated) DEVICE — SYRINGE, 20 CC, LUER LOCK, MONOJECT, W/O CAP, LF

## (undated) DEVICE — CATHETER, DRAINAGE, NASOGASTRIC, SUMP, SALEM, W/ANTI-REFLUX VALVE, 18 FR, 48 IN

## (undated) DEVICE — TUBING, SUCTION, 3/16 X 1/16 IN

## (undated) DEVICE — NEEDLE, MICRODISSECTION STR 4CM

## (undated) DEVICE — APPLICATOR, COTTON TIP, 3 IN, WOOD, 10-PACK, STERILE

## (undated) DEVICE — STRIP, SKIN CLOSURE, STERI STRIP, REINFORCED, 0.5 X 4 IN

## (undated) DEVICE — SUTURE, CHROMIC GUT 5/0  18  P-13"

## (undated) DEVICE — SYRINGE, MONOJECT, LUER LOCK, 3 CC, LF

## (undated) DEVICE — CONTAINER STERILE SPECIMEN 90ML, STERILE

## (undated) DEVICE — MARKER, SKIN, REGULAR TIP, W/W/FLEXI RULER, LABEL

## (undated) DEVICE — SYRINGE, 10 CC, LUER LOCK

## (undated) DEVICE — SPONGE GAUZE, XRAY SC+RFID, 4X4 16 PLY, STERILE

## (undated) DEVICE — ADHESIVE, SKIN, MASTISOL, 2/3 CC VIAL

## (undated) DEVICE — TOWEL, SURGICAL, NEURO, O/R, 16 X 26, BLUE, STERILE

## (undated) DEVICE — GLOVE, SURGICAL, PROTEXIS PI BLUE W/NEUTHERA, 7.5, PF, LF

## (undated) DEVICE — ARIS COBLATION TURBINATE REDUCTION WAND